# Patient Record
Sex: FEMALE | Race: WHITE | NOT HISPANIC OR LATINO | Employment: OTHER | URBAN - METROPOLITAN AREA
[De-identification: names, ages, dates, MRNs, and addresses within clinical notes are randomized per-mention and may not be internally consistent; named-entity substitution may affect disease eponyms.]

---

## 2017-02-07 ENCOUNTER — TRANSFERRED RECORDS (OUTPATIENT)
Dept: HEALTH INFORMATION MANAGEMENT | Facility: CLINIC | Age: 57
End: 2017-02-07

## 2017-06-20 ENCOUNTER — MYC MEDICAL ADVICE (OUTPATIENT)
Dept: PEDIATRICS | Facility: CLINIC | Age: 57
End: 2017-06-20

## 2017-06-20 DIAGNOSIS — Z11.1 SCREENING EXAMINATION FOR PULMONARY TUBERCULOSIS: Primary | ICD-10-CM

## 2017-06-20 NOTE — TELEPHONE ENCOUNTER
Order pending for lab.  Last office appointment-10/04/16.  Order pended, please sign if in agreement.  NITIN Mari RN

## 2017-06-24 ENCOUNTER — HEALTH MAINTENANCE LETTER (OUTPATIENT)
Age: 57
End: 2017-06-24

## 2017-07-12 DIAGNOSIS — Z11.1 SCREENING EXAMINATION FOR PULMONARY TUBERCULOSIS: ICD-10-CM

## 2017-07-12 PROCEDURE — 36415 COLL VENOUS BLD VENIPUNCTURE: CPT | Performed by: INTERNAL MEDICINE

## 2017-07-12 PROCEDURE — 86480 TB TEST CELL IMMUN MEASURE: CPT | Performed by: INTERNAL MEDICINE

## 2017-07-14 LAB
M TB TUBERC IFN-G BLD QL: NEGATIVE
M TB TUBERC IFN-G/MITOGEN IGNF BLD: 0.05 IU/ML

## 2017-08-01 ENCOUNTER — TRANSFERRED RECORDS (OUTPATIENT)
Dept: HEALTH INFORMATION MANAGEMENT | Facility: CLINIC | Age: 57
End: 2017-08-01

## 2017-09-07 ENCOUNTER — OFFICE VISIT (OUTPATIENT)
Dept: PEDIATRICS | Facility: CLINIC | Age: 57
End: 2017-09-07
Payer: COMMERCIAL

## 2017-09-07 VITALS
BODY MASS INDEX: 19.7 KG/M2 | TEMPERATURE: 98.5 F | DIASTOLIC BLOOD PRESSURE: 64 MMHG | SYSTOLIC BLOOD PRESSURE: 110 MMHG | WEIGHT: 133 LBS | HEIGHT: 69 IN | RESPIRATION RATE: 16 BRPM | HEART RATE: 76 BPM

## 2017-09-07 DIAGNOSIS — M72.0 DUPUYTREN'S CONTRACTURE OF RIGHT HAND: Primary | ICD-10-CM

## 2017-09-07 PROCEDURE — 99213 OFFICE O/P EST LOW 20 MIN: CPT | Performed by: INTERNAL MEDICINE

## 2017-09-07 NOTE — PATIENT INSTRUCTIONS
You are negative for HPV and last pap smear was normal in 2014 so you only needs a pap every 5 years.     Leave the area alone and keep an eye on it. If you develop trigger finger or loss of motion in your hand we will refer you to a hand surgeon.

## 2017-09-07 NOTE — MR AVS SNAPSHOT
After Visit Summary   9/7/2017    Katerina Elizondo    MRN: [id number]           Patient Information     Date Of Birth          1960        Visit Information        Provider Department      9/7/2017 10:40 AM Mily Pak MD [facility]        Today's Diagnoses     Dupuytren's contracture of left hand    -  1      Care Instructions    You are negative for HPV and last pap smear was normal in 2014 so you only needs a pap every 5 years.     Leave the area alone and keep an eye on it. If you develop trigger finger or loss of motion in your hand we will refer you to a hand surgeon.             Follow-ups after your visit        Who to contact     If you have questions or need follow up information about today's clinic visit or your schedule please contact [facility] directly at [phone].  Normal or non-critical lab and imaging results will be communicated to you by [employer], letter or phone within 4 business days after the clinic has received the results. If you do not hear from us within 7 days, please contact the clinic through [employer] or phone. If you have a critical or abnormal lab result, we will notify you by phone as soon as possible.  Submit refill requests through [employer] or call your pharmacy and they will forward the refill request to us. Please allow 3 business days for your refill to be completed.          Additional Information About Your Visit        MyChart Information     [employer] gives you secure access to your electronic health record. If you see a primary care provider, you can also send messages to your care team and make appointments. If you have questions, please call your primary care clinic.  If you do not have a primary care provider, please call [phone] and they will assist you.        Care EveryWhere ID     This is your Care EveryWhere ID. This could be used by other organizations to access your [facility]  "records  [id number]        Your Vitals Were     Pulse Temperature Respirations Height BMI (Body Mass Index)       76 98.5  F (36.9  C) (Oral) 16 5' 8.75\" (1.746 m) 19.78 kg/m2        Blood Pressure from Last 3 Encounters:   09/07/17 110/64   10/04/16 124/60   04/29/14 106/60    Weight from Last 3 Encounters:   09/07/17 133 lb (60.3 kg)   10/04/16 135 lb 1 oz (61.3 kg)   04/29/14 136 lb (61.7 kg)              Today, you had the following     No orders found for display       Primary Care Provider Office Phone # Fax #    Mily Pak [phone] [address] [place] [address]        Equal Access to Services     [facility]: Hadii yoko muñiz hadasho Sowale, waaxda luqadaha, qaybta kaalmada adeyue, debbie fu . So [facility] [phone].    ATENCIÓN: Si habla español, tiene a ramirez disposición servicios gratuitos de asistencia lingüística. [facility] [phone].    We comply with applicable federal civil rights laws and [place] laws. We do not discriminate on the basis of race, color, national origin, age, disability sex, sexual orientation or gender identity.            Thank you!     Thank you for choosing [facility]  for your care. Our goal is always to provide you with excellent care. Hearing back from our patients is one way we can continue to improve our services. Please take a few minutes to complete the written survey that you may receive in the mail after your visit with us. Thank you!             Your Updated Medication List - Protect others around you: Learn how to safely use, store and throw away your medicines at www.disposemymeds.org.          This list is accurate as of: 9/7/17 11:17 AM.  Always use your most recent med list.                   Brand Name Dispense Instructions for use Diagnosis    vitamin D 1000 UNITS capsule      Take 1 capsule by mouth daily.          "

## 2017-09-07 NOTE — NURSING NOTE
"Chief Complaint   Patient presents with     Musculoskeletal Problem     Rt hand palm       Initial /64  Pulse 76  Temp 98.5  F (36.9  C) (Oral)  Resp 16  Ht 5' 8.75\" (1.746 m)  Wt 133 lb (60.3 kg)  BMI 19.78 kg/m2 Estimated body mass index is 19.78 kg/(m^2) as calculated from the following:    Height as of this encounter: 5' 8.75\" (1.746 m).    Weight as of this encounter: 133 lb (60.3 kg).  Medication Reconciliation: complete   Jayla J, CMA,AAMA        "

## 2017-09-07 NOTE — PROGRESS NOTES
SUBJECTIVE:   Katerina Elizondo is a 57 year old female who presents to clinic today for the following health issues:    Katerina presents to the clinic today with the chief complaint of a contracture in her right hand. The problem started around April when she noticed small bumps in her right hand that have persised. The area is non tender and is uncomfortable if pushed on forcefully. She thinks that it occurred from chopping vegetables in the kitchen and reports dense tissue in the area.     Patient was told by a nurse practitioner she needed a pap smear and was wondering how long to wait between testing. She did have HPV testing with her last pap in 2014    Musculoskeletal problem/pain      Duration: 4-6 month    Description  Location: Rt palm of hand    Intensity:  moderate    Accompanying signs and symptoms: uncomfortable    History  Previous similar problem: no   Previous evaluation:  none    Precipitating or alleviating factors:  Trauma or overuse: YES over using. Pt thinks its due to cutting with knife  Aggravating factors include: driving and gripping items.    Therapies tried and outcome: trying to protect it more feels good.        Problem list and histories reviewed & adjusted, as indicated.  Additional history: as documented    Patient Active Problem List   Diagnosis     Menopause     Osteopenia     Advanced directives, counseling/discussion     Hyperlipidemia LDL goal <130     Past Surgical History:   Procedure Laterality Date     HYSTEROSCOPY      lazer ablation of endometriosis     TONSILLECTOMY & ADENOIDECTOMY         Social History   Substance Use Topics     Smoking status: Never Smoker     Smokeless tobacco: Never Used     Alcohol use Yes      Comment: socially     Family History   Problem Relation Age of Onset     Unknown/Adopted Mother      Unknown/Adopted Father          Current Outpatient Prescriptions   Medication Sig Dispense Refill     Cholecalciferol (VITAMIN D) 1000 UNITS capsule Take 1  "capsule by mouth daily.       No Known Allergies      Reviewed and updated as needed this visit by clinical staffTobacco  Allergies  Meds  Med Hx  Surg Hx  Fam Hx  Soc Hx      Reviewed and updated as needed this visit by Provider         ROS:  Constitutional, HEENT, cardiovascular, pulmonary, gi and gu systems are negative, except as otherwise noted.    MS: POSITIVE for Left hand pain, dense tissue     This document serves as a record of the services and decisions personally performed and made by Mily Pak MD. It was created on her behalf by Mariama Piedra, a trained medical scribe. The creation of this document is based the provider's statements to the medical scribe.    Mariama Piedra September 7, 2017 10:58 AM  OBJECTIVE:   /64  Pulse 76  Temp 98.5  F (36.9  C) (Oral)  Resp 16  Ht 1.746 m (5' 8.75\")  Wt 60.3 kg (133 lb)  BMI 19.78 kg/m2  Body mass index is 19.78 kg/(m^2).  GENERAL: healthy, alert and no distress  MS: no gross musculoskeletal defects noted .  Right palm with bump in center of palm over second MCP.  Normal rom of finger, no trigger finger noted.  Right 1st mcp with small nodule, hard, smooth.      Diagnostic Test Results:  none     ASSESSMENT/PLAN:     (M72.0) Dupuytren's contracture of right hand  (primary encounter diagnosis)  -discussed this is likely developing contracture; as no loss of range of motion or trigger finger would not treat for now  -pt to see hand surgeon if developing pain or loss of ROM     Reviewed current ACOG guidelines regarding pap smears.  As patient has had normal paps, a negative HPV with her last pap she is considered low risk and can screen every 5 years.  Next pap with HPV due 4/2019.       Follow up for annual physical examination.       The information in this document, created by the medical scribe for me, accurately reflects the services I personally performed and the decisions made by me. I have reviewed and approved this document for " accuracy prior to leaving the patient care area.  Mily Pak MD  [facility]

## 2017-09-09 ENCOUNTER — HEALTH MAINTENANCE LETTER (OUTPATIENT)
Age: 57
End: 2017-09-09

## 2018-01-29 ENCOUNTER — TELEPHONE (OUTPATIENT)
Dept: PEDIATRICS | Facility: CLINIC | Age: 58
End: 2018-01-29

## 2018-01-29 DIAGNOSIS — Z12.31 ENCOUNTER FOR SCREENING MAMMOGRAM FOR BREAST CANCER: Primary | ICD-10-CM

## 2018-01-29 NOTE — TELEPHONE ENCOUNTER
Patient scheduled Mammogram, on Thursday, February, 8th, 2018 at 10:45am.    Please order 3D Mammogram, in order to scheduled requested specified procedure.

## 2018-01-30 NOTE — TELEPHONE ENCOUNTER
Order placed.  There isn't a separate 3 D mammo order, she just needs to tell them she wants the 3d.    Mily Pak MD

## 2018-02-08 ENCOUNTER — RADIANT APPOINTMENT (OUTPATIENT)
Dept: MAMMOGRAPHY | Facility: CLINIC | Age: 58
End: 2018-02-08
Payer: COMMERCIAL

## 2018-02-08 DIAGNOSIS — Z12.31 ENCOUNTER FOR SCREENING MAMMOGRAM FOR BREAST CANCER: ICD-10-CM

## 2018-02-08 PROCEDURE — 77067 SCR MAMMO BI INCL CAD: CPT | Mod: TC

## 2018-02-08 PROCEDURE — 77063 BREAST TOMOSYNTHESIS BI: CPT | Mod: TC

## 2018-05-18 ENCOUNTER — TELEPHONE (OUTPATIENT)
Dept: OTHER | Facility: CLINIC | Age: 58
End: 2018-05-18

## 2018-05-18 ENCOUNTER — TELEPHONE (OUTPATIENT)
Dept: PEDIATRICS | Facility: CLINIC | Age: 58
End: 2018-05-18

## 2018-05-18 NOTE — TELEPHONE ENCOUNTER
Panel Management Review      Patient has the following on her problem list: None      Composite cancer screening  Chart review shows that this patient is due/due soon for the following None  Summary:    Patient is due/failing the following:   PAP    Action needed:   None    Type of outreach:    None, routed to provider for review.    Questions for provider review:    None                                                                                                                                    Sarahi Escalera CMA ([facility])        Chart routed to Care Team .

## 2018-07-31 ENCOUNTER — MYC MEDICAL ADVICE (OUTPATIENT)
Dept: PEDIATRICS | Facility: CLINIC | Age: 58
End: 2018-07-31

## 2018-07-31 DIAGNOSIS — Z11.1 SCREENING EXAMINATION FOR PULMONARY TUBERCULOSIS: ICD-10-CM

## 2018-07-31 DIAGNOSIS — Z01.84 IMMUNITY STATUS TESTING: Primary | ICD-10-CM

## 2018-07-31 NOTE — TELEPHONE ENCOUNTER
Labs to document immunity?  Orders pended for antibody testing, please review and sign if in agreement.  NITIN Mari RN

## 2018-08-01 NOTE — TELEPHONE ENCOUNTER
Chart addended to cancel order for Rubella antibody as patient has this documentation already.  NITIN Mari RN

## 2018-08-03 DIAGNOSIS — Z11.1 SCREENING EXAMINATION FOR PULMONARY TUBERCULOSIS: ICD-10-CM

## 2018-08-03 DIAGNOSIS — Z01.84 IMMUNITY STATUS TESTING: ICD-10-CM

## 2018-08-03 PROCEDURE — 86480 TB TEST CELL IMMUN MEASURE: CPT | Performed by: INTERNAL MEDICINE

## 2018-08-03 PROCEDURE — 86765 RUBEOLA ANTIBODY: CPT | Performed by: INTERNAL MEDICINE

## 2018-08-03 PROCEDURE — 86787 VARICELLA-ZOSTER ANTIBODY: CPT | Performed by: INTERNAL MEDICINE

## 2018-08-03 PROCEDURE — 86735 MUMPS ANTIBODY: CPT | Performed by: INTERNAL MEDICINE

## 2018-08-03 PROCEDURE — 36415 COLL VENOUS BLD VENIPUNCTURE: CPT | Performed by: INTERNAL MEDICINE

## 2018-08-06 LAB
M TB TUBERC IFN-G BLD QL: NEGATIVE
M TB TUBERC IFN-G/MITOGEN IGNF BLD: 0.01 IU/ML
MEV IGG SER QL IA: 6.4 AI (ref 0–0.8)
MUV IGG SER QL IA: 1.4 AI (ref 0–0.8)
VZV IGG SER QL IA: 4 AI (ref 0–0.8)

## 2018-10-31 ENCOUNTER — MYC MEDICAL ADVICE (OUTPATIENT)
Dept: PEDIATRICS | Facility: CLINIC | Age: 58
End: 2018-10-31

## 2019-03-22 DIAGNOSIS — Z12.31 VISIT FOR SCREENING MAMMOGRAM: ICD-10-CM

## 2019-03-22 PROCEDURE — 77063 BREAST TOMOSYNTHESIS BI: CPT | Mod: TC

## 2019-03-22 PROCEDURE — 77067 SCR MAMMO BI INCL CAD: CPT | Mod: TC

## 2019-05-30 ENCOUNTER — MYC MEDICAL ADVICE (OUTPATIENT)
Dept: PEDIATRICS | Facility: CLINIC | Age: 59
End: 2019-05-30

## 2019-06-05 ENCOUNTER — MYC MEDICAL ADVICE (OUTPATIENT)
Dept: PEDIATRICS | Facility: CLINIC | Age: 59
End: 2019-06-05

## 2019-06-06 NOTE — TELEPHONE ENCOUNTER
Please review the [facility] message from pt & help to reschedule. Thanks.    Joanna MIRANDA, RN  Triage Nurse

## 2019-09-18 ENCOUNTER — MYC MEDICAL ADVICE (OUTPATIENT)
Dept: PEDIATRICS | Facility: CLINIC | Age: 59
End: 2019-09-18

## 2019-09-18 DIAGNOSIS — Z11.1 SCREENING EXAMINATION FOR PULMONARY TUBERCULOSIS: Primary | ICD-10-CM

## 2019-09-23 DIAGNOSIS — Z11.1 SCREENING EXAMINATION FOR PULMONARY TUBERCULOSIS: ICD-10-CM

## 2019-09-23 PROCEDURE — 86481 TB AG RESPONSE T-CELL SUSP: CPT | Performed by: INTERNAL MEDICINE

## 2019-09-23 PROCEDURE — 36415 COLL VENOUS BLD VENIPUNCTURE: CPT | Performed by: INTERNAL MEDICINE

## 2019-09-25 LAB
GAMMA INTERFERON BACKGROUND BLD IA-ACNC: 0.02 IU/ML
M TB IFN-G BLD-IMP: NEGATIVE
M TB IFN-G CD4+ BCKGRND COR BLD-ACNC: 9.76 IU/ML
MITOGEN IGNF BCKGRD COR BLD-ACNC: 0.04 IU/ML
MITOGEN IGNF BCKGRD COR BLD-ACNC: 0.05 IU/ML

## 2019-10-07 ENCOUNTER — MYC MEDICAL ADVICE (OUTPATIENT)
Dept: PEDIATRICS | Facility: CLINIC | Age: 59
End: 2019-10-07

## 2019-10-28 ENCOUNTER — MYC MEDICAL ADVICE (OUTPATIENT)
Dept: PEDIATRICS | Facility: CLINIC | Age: 59
End: 2019-10-28

## 2019-10-28 DIAGNOSIS — M72.0 DUPUYTREN CONTRACTURE: Primary | ICD-10-CM

## 2019-10-28 DIAGNOSIS — M72.0 DUPUYTREN'S CONTRACTURE OF RIGHT HAND: ICD-10-CM

## 2019-10-29 NOTE — TELEPHONE ENCOUNTER
See Xochilt and silvestree. Last OV was 9/2017. Per OV notes:    (M72.0) Dupuytren's contracture of right hand  (primary encounter diagnosis)  -discussed this is likely developing contracture; as no loss of range of motion or trigger finger would not treat for now  -pt to see hand surgeon if developing pain or loss of ROM

## 2019-11-01 ENCOUNTER — TRANSFERRED RECORDS (OUTPATIENT)
Dept: MULTI SPECIALTY CLINIC | Facility: CLINIC | Age: 59
End: 2019-11-01

## 2019-11-01 LAB — PAP SMEAR - HIM PATIENT REPORTED: NORMAL

## 2019-11-11 ENCOUNTER — TRANSFERRED RECORDS (OUTPATIENT)
Dept: HEALTH INFORMATION MANAGEMENT | Facility: CLINIC | Age: 59
End: 2019-11-11

## 2019-11-11 LAB
HPV ABSTRACT: NORMAL
PAP-ABSTRACT: NORMAL

## 2019-12-15 ENCOUNTER — HEALTH MAINTENANCE LETTER (OUTPATIENT)
Age: 59
End: 2019-12-15

## 2020-02-03 ENCOUNTER — TRANSFERRED RECORDS (OUTPATIENT)
Dept: HEALTH INFORMATION MANAGEMENT | Facility: CLINIC | Age: 60
End: 2020-02-03

## 2020-06-03 ENCOUNTER — VIRTUAL VISIT (OUTPATIENT)
Dept: PEDIATRICS | Facility: CLINIC | Age: 60
End: 2020-06-03
Payer: COMMERCIAL

## 2020-06-03 DIAGNOSIS — Z76.89 ENCOUNTER TO ESTABLISH CARE: Primary | ICD-10-CM

## 2020-06-03 DIAGNOSIS — J30.2 SEASONAL ALLERGIC RHINITIS, UNSPECIFIED TRIGGER: ICD-10-CM

## 2020-06-03 PROCEDURE — 99214 OFFICE O/P EST MOD 30 MIN: CPT | Mod: GC | Performed by: STUDENT IN AN ORGANIZED HEALTH CARE EDUCATION/TRAINING PROGRAM

## 2020-06-03 RX ORDER — ESTRADIOL 0.1 MG/G
CREAM VAGINAL
COMMUNITY
Start: 2020-05-29

## 2020-06-03 RX ORDER — AMOXICILLIN 500 MG
CAPSULE ORAL
COMMUNITY
End: 2023-09-28

## 2020-06-03 RX ORDER — CETIRIZINE HYDROCHLORIDE 10 MG/1
10 TABLET ORAL DAILY
COMMUNITY
End: 2022-10-24

## 2020-06-03 NOTE — PROGRESS NOTES
"Katerina Elziondo is a 60 year old female who is being evaluated via a billable telephone visit.      The patient has been notified of following:     \"This telephone visit will be conducted via a call between you and your physician/provider. We have found that certain health care needs can be provided without the need for a physical exam.  This service lets us provide the care you need with a short phone conversation.  If a prescription is necessary we can send it directly to your pharmacy.  If lab work is needed we can place an order for that and you can then stop by our lab to have the test done at a later time.    Telephone visits are billed at different rates depending on your insurance coverage. During this emergency period, for some insurers they may be billed the same as an in-person visit.  Please reach out to your insurance provider with any questions.    If during the course of the call the physician/provider feels a telephone visit is not appropriate, you will not be charged for this service.\"    Patient has given verbal consent for Telephone visit?  Yes    What phone number would you like to be contacted at? [phone]    How would you like to obtain your AVS? Xochilt Odom     Katerina Elizondo is a 60 year old female who presents via phone visit today for the following health issues:    HPI Answers for HPI/ROS submitted by the patient on 6/2/2020   Chronic problems general questions HPI Form  How many servings of fruits and vegetables do you eat daily?: 4 or more  On average, how many sweetened beverages do you drink each day (Examples: soda, juice, sweet tea, etc.  Do NOT count diet or artificially sweetened beverages)?: 1  How many minutes a day do you exercise enough to make your heart beat faster?: 30 to 60  How many days a week do you exercise enough to make your heart beat faster?: 4  How many days per week do you miss taking your medication?: 0    New Patient/Transfer of Care    Interested " in establishing care with a new provider since her PCP has left   -------------------------------------    Patient Active Problem List   Diagnosis     Menopause     Osteopenia     Advanced directives, counseling/discussion     Hyperlipidemia LDL goal <130     Past Surgical History:   Procedure Laterality Date     HYSTEROSCOPY      lazer ablation of endometriosis     TONSILLECTOMY & ADENOIDECTOMY         Social History     Tobacco Use     Smoking status: Never Smoker     Smokeless tobacco: Never Used   Substance Use Topics     Alcohol use: Yes     Comment: socially     Family History   Problem Relation Age of Onset     Unknown/Adopted Mother      Unknown/Adopted Father          Current Outpatient Medications   Medication Sig Dispense Refill     Calcium Carb-Cholecalciferol (CALCIUM 1000 + D PO)        cetirizine (ZYRTEC) 10 MG tablet Take 10 mg by mouth daily       estradiol (ESTRACE) 0.1 MG/GM vaginal cream I 1 GRAM VAGINALLY 3 TIMES Q WK       Omega-3 Fatty Acids (FISH OIL) 1200 MG capsule        Allergies   Allergen Reactions     Seasonal Allergies      Recent Labs   Lab Test 10/11/16  0752 04/29/14  1049 10/23/12  1032   * 177* 195*   HDL 46* 34* 46*   TRIG 71 101 93   ALT 21 24 27   CR 0.71 0.70 0.61   GFRESTIMATED 84 87 >90   GFRESTBLACK >90   GFR Calc   >90 >90   POTASSIUM 4.1 4.2 3.9      BP Readings from Last 3 Encounters:   09/07/17 110/64   10/04/16 124/60   04/29/14 106/60    Wt Readings from Last 3 Encounters:   09/07/17 60.3 kg (133 lb)   10/04/16 61.3 kg (135 lb 1 oz)   04/29/14 61.7 kg (136 lb)                    Reviewed and updated as needed this visit by Provider  Meds         Review of Systems   Constitutional, HEENT, cardiovascular, pulmonary, gi and gu systems are negative, except as otherwise noted.       Objective   Reported vitals:  There were no vitals taken for this visit.   healthy and no distress  PSYCH: Alert and oriented times 3; coherent speech, normal   rate  and volume, able to articulate logical thoughts, able   to abstract reason, no tangential thoughts, no hallucinations   or delusions  Her affect is normal  RESP: No cough, no audible wheezing, able to talk in full sentences  Remainder of exam unable to be completed due to telephone visits    Diagnostic Test Results:  none         Assessment/Plan:  1. Establish care:  Hannah is doing well.  She does not have any specific concerns today.  She notes intermittent muscle aches and pains for which she will take tylenol a couple times a week.  Increased stress in the setting of COVID, but manageable with gardening and regular exercise.    2. Seasonal allergies:   Seasonal allergies.  Currently most symptoms are nasal.  Notes that she has been taking zyrtec, however it makes her sleepy.  -Try flonase  - Can continue cetirizine per preference     Return in about 6 months (around 12/3/2020) for Routine Visit.      Phone call duration:  25 minutes    Priyanka Ivan MD    I have discussed the patient with the resident and agree with the jointly developed plan as documented above    Georgina Franco MD  Internal Medicine - Pediatrics

## 2020-06-03 NOTE — PATIENT INSTRUCTIONS
Great talking to you today!     I set Dr. Torres as your new PCP.    For your allergies you can try flonase, which you can buy over the counter to see if this helps with your congestion and runny nose.

## 2020-08-24 ENCOUNTER — ANCILLARY PROCEDURE (OUTPATIENT)
Dept: MAMMOGRAPHY | Facility: CLINIC | Age: 60
End: 2020-08-24
Attending: PEDIATRICS
Payer: COMMERCIAL

## 2020-08-24 DIAGNOSIS — Z12.31 VISIT FOR SCREENING MAMMOGRAM: ICD-10-CM

## 2020-08-24 PROCEDURE — 77067 SCR MAMMO BI INCL CAD: CPT | Mod: TC

## 2020-08-24 PROCEDURE — 77063 BREAST TOMOSYNTHESIS BI: CPT | Mod: TC

## 2021-01-15 ENCOUNTER — HEALTH MAINTENANCE LETTER (OUTPATIENT)
Age: 61
End: 2021-01-15

## 2021-04-14 ENCOUNTER — IMMUNIZATION (OUTPATIENT)
Dept: NURSING | Facility: CLINIC | Age: 61
End: 2021-04-14
Payer: COMMERCIAL

## 2021-04-14 PROCEDURE — 0001A PR COVID VAC PFIZER DIL RECON 30 MCG/0.3 ML IM: CPT

## 2021-04-14 PROCEDURE — 91300 PR COVID VAC PFIZER DIL RECON 30 MCG/0.3 ML IM: CPT

## 2021-05-05 ENCOUNTER — IMMUNIZATION (OUTPATIENT)
Dept: NURSING | Facility: CLINIC | Age: 61
End: 2021-05-05
Attending: INTERNAL MEDICINE
Payer: COMMERCIAL

## 2021-05-05 PROCEDURE — 0002A PR COVID VAC PFIZER DIL RECON 30 MCG/0.3 ML IM: CPT

## 2021-05-05 PROCEDURE — 91300 PR COVID VAC PFIZER DIL RECON 30 MCG/0.3 ML IM: CPT

## 2021-07-21 ENCOUNTER — TRANSFERRED RECORDS (OUTPATIENT)
Dept: HEALTH INFORMATION MANAGEMENT | Facility: CLINIC | Age: 61
End: 2021-07-21

## 2021-09-09 ENCOUNTER — ANCILLARY PROCEDURE (OUTPATIENT)
Dept: MAMMOGRAPHY | Facility: CLINIC | Age: 61
End: 2021-09-09
Attending: PEDIATRICS
Payer: COMMERCIAL

## 2021-09-09 DIAGNOSIS — Z12.31 VISIT FOR SCREENING MAMMOGRAM: ICD-10-CM

## 2021-09-09 PROCEDURE — 77063 BREAST TOMOSYNTHESIS BI: CPT | Mod: TC | Performed by: RADIOLOGY

## 2021-09-09 PROCEDURE — 77067 SCR MAMMO BI INCL CAD: CPT | Mod: TC | Performed by: RADIOLOGY

## 2021-09-20 ENCOUNTER — OFFICE VISIT (OUTPATIENT)
Dept: PEDIATRICS | Facility: CLINIC | Age: 61
End: 2021-09-20
Payer: COMMERCIAL

## 2021-09-20 VITALS
HEART RATE: 71 BPM | SYSTOLIC BLOOD PRESSURE: 126 MMHG | BODY MASS INDEX: 19.95 KG/M2 | OXYGEN SATURATION: 98 % | WEIGHT: 134.7 LBS | HEIGHT: 69 IN | DIASTOLIC BLOOD PRESSURE: 58 MMHG | TEMPERATURE: 97.8 F | RESPIRATION RATE: 14 BRPM

## 2021-09-20 DIAGNOSIS — Z00.00 ROUTINE GENERAL MEDICAL EXAMINATION AT A HEALTH CARE FACILITY: Primary | ICD-10-CM

## 2021-09-20 DIAGNOSIS — Z13.220 SCREENING CHOLESTEROL LEVEL: ICD-10-CM

## 2021-09-20 DIAGNOSIS — Z13.1 SCREENING FOR DIABETES MELLITUS: ICD-10-CM

## 2021-09-20 DIAGNOSIS — M85.80 OSTEOPENIA, UNSPECIFIED LOCATION: ICD-10-CM

## 2021-09-20 DIAGNOSIS — E78.5 HYPERLIPIDEMIA LDL GOAL <160: ICD-10-CM

## 2021-09-20 PROBLEM — M72.0 DUPUYTREN'S CONTRACTURE OF RIGHT HAND: Status: ACTIVE | Noted: 2021-09-20

## 2021-09-20 PROCEDURE — 99386 PREV VISIT NEW AGE 40-64: CPT | Performed by: INTERNAL MEDICINE

## 2021-09-20 ASSESSMENT — ENCOUNTER SYMPTOMS
BREAST MASS: 0
ABDOMINAL PAIN: 0
ARTHRALGIAS: 0
HEARTBURN: 0
SHORTNESS OF BREATH: 0
HEMATURIA: 0
CONSTIPATION: 0
DYSURIA: 0
FEVER: 0
NAUSEA: 0
HEMATOCHEZIA: 0
CHILLS: 0
EYE PAIN: 0
WEAKNESS: 0
HEADACHES: 0
FREQUENCY: 0
PALPITATIONS: 0
DIZZINESS: 0
MYALGIAS: 0
DIARRHEA: 0
COUGH: 0
SORE THROAT: 0
JOINT SWELLING: 0
NERVOUS/ANXIOUS: 0
PARESTHESIAS: 0

## 2021-09-20 ASSESSMENT — MIFFLIN-ST. JEOR: SCORE: 1226.41

## 2021-09-20 NOTE — PROGRESS NOTES
SUBJECTIVE:   CC: Katerina Elizondo is an 61 year old woman who presents for preventive health visit.   Patient has been advised of split billing requirements and indicates understanding: Yes     Healthy Habits:     Getting at least 3 servings of Calcium per day:  NO    Bi-annual eye exam:  Yes    Dental care twice a year:  NO    Sleep apnea or symptoms of sleep apnea:  None    Diet:  Regular (no restrictions)    Frequency of exercise:  6-7 days/week    Duration of exercise:  30-45 minutes    Taking medications regularly:  No    Medication side effects:  Other    PHQ-2 Total Score: 0    Additional concerns today:  Yes    - Would like to establish care     Does some online yoga  Walks a lot with   Does her own strength training a few times a week  Really doing well right now - mental health took a hit during the pandemic.   Birth mother didn't want to connect.     # HCM  - mammo done 9/9/21  - colon done 10/29/2012  - last pap OB/GYN Dr Estelita Valadez on this date: 11/2019 - [facility]; saw her a month ago.   - future fasting labs  - zoster due     Today's PHQ-2 Score:   PHQ-2 ( 1999 Pfizer) 9/20/2021   Q1: Little interest or pleasure in doing things 0   Q2: Feeling down, depressed or hopeless 0   PHQ-2 Score 0   Q1: Little interest or pleasure in doing things Not at all   Q2: Feeling down, depressed or hopeless Not at all   PHQ-2 Score 0     Abuse: Current or Past (Physical, Sexual or Emotional) - No  Do you feel safe in your environment? Yes    Have you ever done Advance Care Planning? (For example, a Health Directive, POLST, or a discussion with a medical provider or your loved ones about your wishes): No, advance care planning information given to patient to review.  Patient plans to discuss their wishes with loved ones or provider.      Social History     Tobacco Use     Smoking status: Never Smoker     Smokeless tobacco: Never Used   Substance Use Topics     Alcohol use: Yes     Comment: socially      Alcohol Use 9/20/2021   Prescreen: >3 drinks/day or >7 drinks/week? No   Prescreen: >3 drinks/day or >7 drinks/week? -     Reviewed orders with patient.  Reviewed health maintenance and updated orders accordingly - Yes    Breast Cancer Screening:  Any new diagnosis of family breast, ovarian, or bowel cancer? No    FHS-7:   Breast CA Risk Assessment (FHS-7) 9/9/2021   Did any of your first-degree relatives have breast or ovarian cancer? Unknown   Did any of your relatives have bilateral breast cancer? Unknown   Did any man in your family have breast cancer? Unknown   Did any woman in your family have breast and ovarian cancer? Unknown   Did any woman in your family have breast cancer before age 50 y? Unknown   Do you have 2 or more relatives with breast and/or ovarian cancer? Unknown   Do you have 2 or more relatives with breast and/or bowel cancer? Unknown     Pertinent mammograms are reviewed under the imaging tab.    History of abnormal Pap smear: NO - age 30-65 PAP every 5 years with negative HPV co-testing recommended  Will get records from OB. Has no concerns and prefers to space to 5 years if qualfies.     Reviewed and updated as needed this visit by clinical staff  Tobacco  Allergies  Meds   Med Hx  Surg Hx  Fam Hx  Soc Hx        Reviewed and updated as needed this visit by Provider                Past Medical History:   Diagnosis Date     Endometriosis       Past Surgical History:   Procedure Laterality Date     HYSTEROSCOPY      lazer ablation of endometriosis     TONSILLECTOMY & ADENOIDECTOMY         Review of Systems   Constitutional: Negative for chills and fever.   HENT: Negative for congestion, ear pain, hearing loss and sore throat.    Eyes: Negative for pain and visual disturbance.   Respiratory: Negative for cough and shortness of breath.    Cardiovascular: Negative for chest pain, palpitations and peripheral edema.   Gastrointestinal: Negative for abdominal pain, constipation, diarrhea,  "heartburn, hematochezia and nausea.   Breasts:  Negative for tenderness, breast mass and discharge.   Genitourinary: Negative for dysuria, frequency, genital sores, hematuria, pelvic pain, urgency, vaginal bleeding and vaginal discharge.   Musculoskeletal: Negative for arthralgias, joint swelling and myalgias.   Skin: Negative for rash.   Neurological: Negative for dizziness, weakness, headaches and paresthesias.   Psychiatric/Behavioral: Negative for mood changes. The patient is not nervous/anxious.         OBJECTIVE:   /58 (BP Location: Right arm, Patient Position: Chair, Cuff Size: Adult Regular)   Pulse 71   Temp 97.8  F (36.6  C) (Tympanic)   Resp 14   Ht 1.746 m (5' 8.75\")   Wt 61.1 kg (134 lb 11.2 oz)   SpO2 98%   BMI 20.04 kg/m    Physical Exam  GENERAL: healthy, alert and no distress  EYES: Eyes grossly normal to inspection, PERRL and conjunctivae and sclerae normal  HENT: ear canals and TM's normal, nose and mouth without ulcers or lesions  NECK: no adenopathy, no asymmetry, masses, or scars and thyroid normal to palpation  RESP: lungs clear to auscultation - no rales, rhonchi or wheezes  CV: regular rate and rhythm, normal S1 S2, no S3 or S4, no murmur, click or rub, no peripheral edema and peripheral pulses strong  ABDOMEN: soft, nontender, no hepatosplenomegaly, no masses and bowel sounds normal  MS: no gross musculoskeletal defects noted, no edema  SKIN: no suspicious lesions or rashes  NEURO: Normal strength and tone, mentation intact and speech normal  PSYCH: mentation appears normal, affect normal/bright    Diagnostic Test Results:  Labs reviewed in Epic    ASSESSMENT/PLAN:   (Z00.00) Routine general medical examination at a health care facility  (primary encounter diagnosis)  - due for shingles vaccine - will get at pharmacy  - due for dexa as below  - will return for fasting labs  - mammo utd  - colonoscopy utd  - will get pap records from OB.    (M86.80) Osteopenia, unspecified " "location  Vitamin D Deficiency Screening Results:  Lab Results   Component Value Date    VITDT 55 04/29/2014    VITDT  10/23/2012     Specimen sent to [facility], see misc result.   On 1000 international unit(s) daily.   Due for 5 year f/up in October.   Plan: DX Hip/Pelvis/Spine, Vitamin D Deficiency    (Z13.1) Screening for diabetes mellitus  Plan: Comprehensive metabolic panel (BMP + Alb, Alk         Phos, ALT, AST, Total. Bili, TP), DX         Hip/Pelvis/Spine    (Z13.220) Screening cholesterol level  Plan: Lipid panel reflex to direct LDL Fasting    Patient has been advised of split billing requirements and indicates understanding: No  COUNSELING:  Reviewed preventive health counseling, as reflected in patient instructions       Regular exercise       Healthy diet/nutrition       Immunizations       Osteoporosis prevention/bone health       Colon cancer screening    Estimated body mass index is 20.04 kg/m  as calculated from the following:    Height as of this encounter: 1.746 m (5' 8.75\").    Weight as of this encounter: 61.1 kg (134 lb 11.2 oz).    She reports that she has never smoked. She has never used smokeless tobacco.    Counseling Resources:  ATP IV Guidelines  Pooled Cohorts Equation Calculator  Breast Cancer Risk Calculator  BRCA-Related Cancer Risk Assessment: FHS-7 Tool  FRAX Risk Assessment  ICSI Preventive Guidelines  Dietary Guidelines for Americans, 2010  USDA's MyPlate  ASA Prophylaxis  Lung CA Screening    Michael Umanzor MD  [facility]  "

## 2021-09-20 NOTE — PATIENT INSTRUCTIONS
Great to meet you!    We'll have you back for fasting labs.   We can do your dexa after 10/24/21.    If you are over 50 I recommend the new Shingrix vaccine. Two doses of Shingrix provides more than 90% protection against shingles and postherpetic neuralgia (PHN), a type of chronic pain that is the most common complication of shingles.   - even if you've had the older shingles vaccine (Zostavax) it's okay to get the new vaccine.   - even if you've had singles before the vaccine can be helpful.    You do not need an appointment and can walk in any time they are open. The vaccine is a two shot series - I recommend getting the second shot 2-6 months after the first dose.    You may have a sore arm and feel mild flu-like symptoms for a day or two after the vaccine. Most people do not need to adjust their regular activities. It's okay to take tylenol or ibuprofen if you have side effects.       Preventive Health Recommendations  Female Ages 50 - 64    Yearly exam: See your health care provider every year in order to  o Review health changes.   o Discuss preventive care.    o Review your medicines if your doctor has prescribed any.      Get a Pap test every three years (unless you have an abnormal result and your provider advises testing more often).    If you get Pap tests with HPV test, you only need to test every 5 years, unless you have an abnormal result.     You do not need a Pap test if your uterus was removed (hysterectomy) and you have not had cancer.    You should be tested each year for STDs (sexually transmitted diseases) if you're at risk.     Have a mammogram every 1 to 2 years.    Have a colonoscopy at age 50, or have a yearly FIT test (stool test). These exams screen for colon cancer.      Have a cholesterol test every 5 years, or more often if advised.    Have a diabetes test (fasting glucose) every three years. If you are at risk for diabetes, you should have this test more often.     If you are at risk  for osteoporosis (brittle bone disease), think about having a bone density scan (DEXA).    Shots: Get a flu shot each year. Get a tetanus shot every 10 years.    Nutrition:     Eat at least 5 servings of fruits and vegetables each day.    Eat whole-grain bread, whole-wheat pasta and brown rice instead of white grains and rice.    Get adequate Calcium and Vitamin D.     Lifestyle    Exercise at least 150 minutes a week (30 minutes a day, 5 days a week). This will help you control your weight and prevent disease.    Limit alcohol to one drink per day.    No smoking.     Wear sunscreen to prevent skin cancer.     See your dentist every six months for an exam and cleaning.    See your eye doctor every 1 to 2 years.

## 2021-09-20 NOTE — PROGRESS NOTES
# HCM  - mammo done 9/9/21  - colon done 10/29/2012  - last pap OB/GYN Dr Estelita Valadez on this date: 11/2019 - [facility]  - future fasting labs  - zoster due

## 2021-10-12 ENCOUNTER — LAB (OUTPATIENT)
Dept: LAB | Facility: CLINIC | Age: 61
End: 2021-10-12
Payer: COMMERCIAL

## 2021-10-12 DIAGNOSIS — M85.80 OSTEOPENIA, UNSPECIFIED LOCATION: ICD-10-CM

## 2021-10-12 DIAGNOSIS — Z13.1 SCREENING FOR DIABETES MELLITUS: ICD-10-CM

## 2021-10-12 DIAGNOSIS — Z13.220 SCREENING CHOLESTEROL LEVEL: ICD-10-CM

## 2021-10-12 LAB — DEPRECATED CALCIDIOL+CALCIFEROL SERPL-MC: 55 UG/L (ref 20–75)

## 2021-10-12 PROCEDURE — 80061 LIPID PANEL: CPT

## 2021-10-12 PROCEDURE — 82306 VITAMIN D 25 HYDROXY: CPT

## 2021-10-12 PROCEDURE — 36415 COLL VENOUS BLD VENIPUNCTURE: CPT

## 2021-10-12 PROCEDURE — 80053 COMPREHEN METABOLIC PANEL: CPT

## 2021-10-13 LAB
ALBUMIN SERPL-MCNC: 3.8 G/DL (ref 3.4–5)
ALP SERPL-CCNC: 43 U/L (ref 40–150)
ALT SERPL W P-5'-P-CCNC: 20 U/L (ref 0–50)
ANION GAP SERPL CALCULATED.3IONS-SCNC: 6 MMOL/L (ref 3–14)
AST SERPL W P-5'-P-CCNC: 24 U/L (ref 0–45)
BILIRUB SERPL-MCNC: 0.6 MG/DL (ref 0.2–1.3)
BUN SERPL-MCNC: 18 MG/DL (ref 7–30)
CALCIUM SERPL-MCNC: 9 MG/DL (ref 8.5–10.1)
CHLORIDE BLD-SCNC: 106 MMOL/L (ref 94–109)
CHOLEST SERPL-MCNC: 250 MG/DL
CO2 SERPL-SCNC: 25 MMOL/L (ref 20–32)
CREAT SERPL-MCNC: 0.79 MG/DL (ref 0.52–1.04)
FASTING STATUS PATIENT QL REPORTED: YES
GFR SERPL CREATININE-BSD FRML MDRD: 80 ML/MIN/1.73M2
GLUCOSE BLD-MCNC: 77 MG/DL (ref 70–99)
HDLC SERPL-MCNC: 49 MG/DL
LDLC SERPL CALC-MCNC: 187 MG/DL
NONHDLC SERPL-MCNC: 201 MG/DL
POTASSIUM BLD-SCNC: 4.1 MMOL/L (ref 3.4–5.3)
PROT SERPL-MCNC: 7.1 G/DL (ref 6.8–8.8)
SODIUM SERPL-SCNC: 137 MMOL/L (ref 133–144)
TRIGL SERPL-MCNC: 72 MG/DL

## 2021-10-25 ENCOUNTER — ANCILLARY PROCEDURE (OUTPATIENT)
Dept: BONE DENSITY | Facility: CLINIC | Age: 61
End: 2021-10-25
Attending: INTERNAL MEDICINE
Payer: COMMERCIAL

## 2021-10-25 DIAGNOSIS — M85.80 OSTEOPENIA, UNSPECIFIED LOCATION: ICD-10-CM

## 2021-10-25 DIAGNOSIS — Z13.1 SCREENING FOR DIABETES MELLITUS: ICD-10-CM

## 2021-10-25 PROCEDURE — 77080 DXA BONE DENSITY AXIAL: CPT | Performed by: INTERNAL MEDICINE

## 2022-08-16 ENCOUNTER — MYC MEDICAL ADVICE (OUTPATIENT)
Dept: PEDIATRICS | Facility: CLINIC | Age: 62
End: 2022-08-16

## 2022-10-11 ENCOUNTER — ANCILLARY PROCEDURE (OUTPATIENT)
Dept: MAMMOGRAPHY | Facility: CLINIC | Age: 62
End: 2022-10-11
Payer: COMMERCIAL

## 2022-10-11 ENCOUNTER — MYC MEDICAL ADVICE (OUTPATIENT)
Dept: PEDIATRICS | Facility: CLINIC | Age: 62
End: 2022-10-11

## 2022-10-11 ENCOUNTER — TELEPHONE (OUTPATIENT)
Dept: PEDIATRICS | Facility: CLINIC | Age: 62
End: 2022-10-11

## 2022-10-11 DIAGNOSIS — Z12.31 VISIT FOR SCREENING MAMMOGRAM: ICD-10-CM

## 2022-10-11 PROCEDURE — 77067 SCR MAMMO BI INCL CAD: CPT | Mod: TC | Performed by: RADIOLOGY

## 2022-10-11 PROCEDURE — 77063 BREAST TOMOSYNTHESIS BI: CPT | Mod: TC | Performed by: RADIOLOGY

## 2022-10-11 NOTE — TELEPHONE ENCOUNTER
Patient calling and saw abnormal mammo result.  Wanting to schedule U/S.  Gave scheduling # and transferred to that #.  Rama Kemp RN    please call [phone] to schedule an appointment for these tests if you have not already done so.

## 2022-10-12 ENCOUNTER — HOSPITAL ENCOUNTER (OUTPATIENT)
Dept: ULTRASOUND IMAGING | Facility: CLINIC | Age: 62
Discharge: HOME | End: 2022-10-12
Attending: INTERNAL MEDICINE | Admitting: INTERNAL MEDICINE
Payer: COMMERCIAL

## 2022-10-12 DIAGNOSIS — R92.8 ABNORMAL MAMMOGRAM: ICD-10-CM

## 2022-10-12 PROCEDURE — 76642 ULTRASOUND BREAST LIMITED: CPT | Mod: LT

## 2022-10-16 ENCOUNTER — HEALTH MAINTENANCE LETTER (OUTPATIENT)
Age: 62
End: 2022-10-16

## 2022-10-18 ENCOUNTER — HOSPITAL ENCOUNTER (OUTPATIENT)
Dept: MAMMOGRAPHY | Facility: CLINIC | Age: 62
Discharge: HOME | End: 2022-10-18
Attending: INTERNAL MEDICINE
Payer: COMMERCIAL

## 2022-10-18 DIAGNOSIS — R92.8 ABNORMAL MAMMOGRAM: ICD-10-CM

## 2022-10-18 PROCEDURE — 999N000065 MA POST PROCEDURE LEFT

## 2022-10-18 PROCEDURE — 250N000009 HC RX 250: Performed by: STUDENT IN AN ORGANIZED HEALTH CARE EDUCATION/TRAINING PROGRAM

## 2022-10-18 PROCEDURE — 19083 BX BREAST 1ST LESION US IMAG: CPT | Mod: LT

## 2022-10-18 PROCEDURE — 38505 NEEDLE BIOPSY LYMPH NODES: CPT | Mod: LT

## 2022-10-18 PROCEDURE — 88305 TISSUE EXAM BY PATHOLOGIST: CPT | Mod: 26 | Performed by: PATHOLOGY

## 2022-10-18 PROCEDURE — 88305 TISSUE EXAM BY PATHOLOGIST: CPT | Mod: TC | Performed by: INTERNAL MEDICINE

## 2022-10-18 RX ADMIN — LIDOCAINE HYDROCHLORIDE 10 ML: 10 INJECTION, SOLUTION INFILTRATION; PERINEURAL at 08:53

## 2022-10-18 NOTE — DISCHARGE INSTRUCTIONS

## 2022-10-18 NOTE — DISCHARGE INSTRUCTIONS

## 2022-10-19 ENCOUNTER — TELEPHONE (OUTPATIENT)
Dept: MAMMOGRAPHY | Facility: CLINIC | Age: 62
End: 2022-10-19

## 2022-10-19 LAB
PATH REPORT.COMMENTS IMP SPEC: NORMAL
PATH REPORT.FINAL DX SPEC: NORMAL
PATH REPORT.GROSS SPEC: NORMAL
PATH REPORT.MICROSCOPIC SPEC OTHER STN: NORMAL
PHOTO IMAGE: NORMAL

## 2022-10-19 NOTE — TELEPHONE ENCOUNTER
"Call placed to Hannah.  verified.     Hannah was notified that pathology results from LEFT breast and Left Axillary Lymph Node biopsy performed on 10/18/2022 revealed:    [facility]  Katerina Elizondo [id number]  F, 1960  Surgical Pathology Report (Final result) [id number]  Authorizing Provider: Michael Umanzor MD Ordering Provider: Michael Umanzor MD  Ordering Location: [facility]  Collected: 10/18/2022 09:12 AM  Pathologist: Chase Rashid MD Received: 10/18/2022 11:31 AM  .  Specimens  A Breast, Left  B Lymph Node(s), Axillary, Left  .  .  Final Diagnosis  A. Breast, left, 4:00, 1 cm from nipple, ultrasound core biopsy-  Benign breast cyst, no evidence of malignancy  B. Lymph node, left axillary, ultrasound core biopsy-  Morphologically benign lymph node tissue  Electronically signed by Chase Rashid MD on 10/19/2022 at 11:45 AM       Per radiologist, Dr. Pete Ackreman, results are concordant with imaging findings.     Recommendation: 6 month follow up LEFT breast ultrasound and diagnostic mammogram.     No issues reported with biopsy sites.  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  She is very concerned about the imaging/biopsy results being \"discordant\".  Hannah declined a Surgical Referral at this time.  I provided our nurse coordinator, Chelita BURGOS, number for future assistance with scheduling.  All patient's questions answered appropriately and thoroughly. Patient will call our office in the interim with additional questions/concerns.     Both parties in agreement of above plan.      Radha Rushing RN BSN  Procedure Nurse  [facility]  [phone]    "

## 2022-10-20 ENCOUNTER — MYC MEDICAL ADVICE (OUTPATIENT)
Dept: PEDIATRICS | Facility: CLINIC | Age: 62
End: 2022-10-20

## 2022-10-20 DIAGNOSIS — R92.8 ABNORMAL MAMMOGRAM: Primary | ICD-10-CM

## 2022-10-20 NOTE — TELEPHONE ENCOUNTER
I appreciate her note and am glad things looked bengin but understand her concern/perspective.     Dr. Valle is usually who my patients see but she just retired. We can call [facility] and see who is replacing her breast work.     Can also touch base w/ the RN Coordinator to see who they would typically refer to - okay to place this referral.     REID Umanzor MD  Internal Medicine-Pediatrics

## 2022-10-20 NOTE — TELEPHONE ENCOUNTER
Dr. Umanzor,    Please see  message and advise    Thank you  Kendy Cespedes RN on 10/20/2022 at 1:22 PM

## 2022-10-20 NOTE — TELEPHONE ENCOUNTER
Referral placed per hudguillermina w/ PCP. Wozityou message sent to patient at this time.     Ten LOERA RN 10/20/2022 at 2:23 PM

## 2022-11-15 ENCOUNTER — OFFICE VISIT (OUTPATIENT)
Dept: SURGERY | Facility: CLINIC | Age: 62
End: 2022-11-15
Payer: COMMERCIAL

## 2022-11-15 VITALS
BODY MASS INDEX: 19.99 KG/M2 | HEIGHT: 69 IN | SYSTOLIC BLOOD PRESSURE: 124 MMHG | HEART RATE: 81 BPM | WEIGHT: 135 LBS | DIASTOLIC BLOOD PRESSURE: 80 MMHG

## 2022-11-15 DIAGNOSIS — R92.8 ABNORMAL MAMMOGRAM OF LEFT BREAST: Primary | ICD-10-CM

## 2022-11-15 DIAGNOSIS — N64.4 BREAST PAIN: ICD-10-CM

## 2022-11-15 DIAGNOSIS — R92.30 INCONCLUSIVE MAMMOGRAM DUE TO DENSE BREASTS: ICD-10-CM

## 2022-11-15 DIAGNOSIS — R92.2 INCONCLUSIVE MAMMOGRAM DUE TO DENSE BREASTS: ICD-10-CM

## 2022-11-15 PROCEDURE — 99203 OFFICE O/P NEW LOW 30 MIN: CPT | Performed by: SURGERY

## 2022-11-15 NOTE — PROGRESS NOTES
[facility] Breast Surgery Consultation    HPI:   Katerina Elizondo is a 62 year old female who is seen in consultation at the request of Dr. Umanzor for evaluation of an abnormality on her breast imaging and follow-up biopsy.    She had a screening mammogram on October 11, 2022 which revealed a possible mass in the left breast at the 4 to 5 o'clock position anterior depth.  She returned for diagnostic imaging and on ultrasound at 4:00 1 cm from the nipple there was a 7 mm cyst corresponding to the mammographic finding and incidentally noted adjacent to the cyst was an irregular hypoechoic mass measuring 7 mm.  Axillary ultrasound demonstrated a mildly thickened left axillary lymph node.  She then had biopsy of the lesion in the breast as well as the axillary lymph node.  The breast biopsy at 4:00 1 cm from the nipple revealed a benign breast cyst with no evidence of malignancy.  The lymph node on biopsy was morphologically benign.  Radiology had recommended short interval follow-up with a 6-month left diagnostic mammogram and ultrasound.  She is here to discuss her options going forward.    She has not had any prior breast biopsies or breast surgeries.  She is otherwise a very healthy 62-year-old.  She has had a prior breast cyst drained about 7 years ago.  She had no breast concerns prior to her screening mammogram.  She is a retired radiologist.    Hormonal history:   menarche 10, 1 children, 1st at age 30, post menopausal, 1 year OCP use, no HRT, no fertility treatment.     Family history of breast cancer: Doesn't know -adopted      Past Medical History:   has a past medical history of Endometriosis.      Current Outpatient Medications:      Calcium Carb-Cholecalciferol (CALCIUM 1000 + D PO), Calcium 1200 mg a day and D3 1000 international unit(s) , Disp: , Rfl:      estradiol (ESTRACE) 0.1 MG/GM vaginal cream, I 1 GRAM VAGINALLY 3 TIMES Q WK, Disp: , Rfl:      Omega-3 Fatty Acids (FISH OIL)  "1200 MG capsule, , Disp: , Rfl:     Past Surgical History:  Past Surgical History:   Procedure Laterality Date     HYSTEROSCOPY      lazer ablation of endometriosis     TONSILLECTOMY & ADENOIDECTOMY             Allergies   Allergen Reactions     Seasonal Allergies          Social History:  Social History     Socioeconomic History     Marital status:      Spouse name: Not on file     Number of children: Not on file     Years of education: Not on file     Highest education level: Not on file   Occupational History     Not on file   Tobacco Use     Smoking status: Never     Smokeless tobacco: Never   Substance and Sexual Activity     Alcohol use: Yes     Comment: socially     Drug use: No     Sexual activity: Yes     Partners: Male     Comment: menopause   Other Topics Concern     Parent/sibling w/ CABG, MI or angioplasty before 65F 55M? 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      Social Determinants of Health     Financial Resource Strain: Not on file   Food Insecurity: Not on file   Transportation Needs: Not on file   Physical Activity: Not on file   Stress: Not on file   Social Connections: Not on file   Intimate Partner Violence: Not on file   Housing Stability: Not on file        ROS:  The 10 point review of systems is negative other than noted in the HPI and above.    PE:  Vitals: /80   Pulse 81   Ht 1.746 m (5' 8.75\")   Wt 61.2 kg (135 lb)   BMI 20.08 kg/m    General appearance: well-nourished, sitting comfortably, no apparent distress  Psych: normal affect, pleasant  HEENT:  Head normocephalic and atraumatic, pupils equal and round, conjunctivae clear, mucous membranes moist, external ears and nose normal  Neck: Supple without thyromegaly or masses  Lungs: Respirations unlabored  Lymphatic: " No cervical, or supraclavicular lymphadenopathy  Extremities: Without edema  Musculoskeletal:  Normal station and gait  Neurologic: nonfocal, grossly intact times four extremities, alert and oriented times three  Psychiatric: Mood and affect are appropriate  Skin: Without lesions or rashes    Breast:  A bilateral breast exam was performed in the supine position.. Bilateral breasts were palpated in a circumferential clockwise fashion including the supraclavicular and axillary areas.   Breasts are symmetric.  Skin is normal.  Nipples are everted bilaterally.  There are no palpable masses in either breast.  Tissue is heterogeneously dense with increased density centrally as well as in the upper outer breast.      Lymph:       No supraclavicular/infraclavicular adenopathy.   Axillary adenopathy: none    Assessment/Plan: Katerina Elizondo is a 62 year old who presents with abnormal breast imaging as above and a question of if  Imaging is concordant.  We reviewed her imaging at length together today and I believe that the biopsied lesion was the cyst and the 7 mm mass may not have been biopsied.  This is difficult to determine and I would recommend additional imaging with a breast MRI to further evaluate both breast and help us determine level of concern of the 7 mm lesion in the left at 4:00 1 cm from the nipple.  We did discuss the option of surgical excisional biopsy and MRI will help guide this pending the findings.  Without an MR we could proceed with surgical excisional biopsy and I would need a radiofrequency tag localization presuming that the clip is in the right location which we would be able to tell by ultrasound.  If there is no enhancement on the MRI it may be reasonable to do a 6-month follow-up versus we will again discussed the surgical excisional biopsy option.  Hannah is in agreement with our plan and all questions were answered.    30 minutes total time spent on the date of this encounter doing: chart  review, review of test results, patient visit, physical exam, education, counseling, developing plan of care, and documenting.    Genoveva Parks MD      Please route or send letter to:  Primary Care Provider (PCP) and Referring Provider

## 2022-11-15 NOTE — NURSING NOTE
Breast Patients    BREAST PATIENTS (ALL)    1-Do you have any of the following symptoms?   2-In which breast are you having the symptoms? left  3-Have you had a Mammogram? Other Location:  [facility]    -  Date:  10/11/22  4-Have you ever had a breast cyst drained? Yes    Date: About 7 years ago   5-Have you ever had a breast biopsy? Yes:  Left   -   Date:  10/18/22  6-Have you ever had a Breast Cancer? No   7-Is there a history of Breast Cancer in your family? No  8-Have you ever had Ovarian Cancer? No  9-Is there a history of Ovarian Cancer in your family? No  10-Summarize your caffeine intake (i.e. coffee, tea, chocolate, soda etc.): 1-2 cups of coffee per day, or tea     BREAST PATIENTS (FEMALE)    11-What age did your periods begin? 12.5 years   12-Date your last menstrual period began? ?  13-Number of full-term pregnancies: 1  14-Your age when your first child was born? 30  15-Did you nurse your children? Yes  16-Are you pregnant now? No  17-Have you begun menopause? Yes  Age Menopause began:  49  18-Have you had either ovary removed?No  19-Do you have breast implants? No   20-Do you use hormone replacement therapy?  No  21-Have you taken oral contraceptive pills?  Yes, For how many years?  1 year in early 20's   22-Have you had an intrauterine device (IUD) placed?  No  23-What is your current bra size?  34 C/D    Sonam Roldan MA

## 2022-11-15 NOTE — PATIENT INSTRUCTIONS
Your breast MRI is scheduled for 12/1/22 12:00pm at the [facility]

## 2022-12-01 ENCOUNTER — ANCILLARY PROCEDURE (OUTPATIENT)
Dept: MRI IMAGING | Facility: CLINIC | Age: 62
End: 2022-12-01
Attending: SURGERY
Payer: COMMERCIAL

## 2022-12-01 DIAGNOSIS — N64.4 BREAST PAIN: ICD-10-CM

## 2022-12-01 DIAGNOSIS — R92.30 INCONCLUSIVE MAMMOGRAM DUE TO DENSE BREASTS: ICD-10-CM

## 2022-12-01 DIAGNOSIS — R92.2 INCONCLUSIVE MAMMOGRAM DUE TO DENSE BREASTS: ICD-10-CM

## 2022-12-01 DIAGNOSIS — R92.8 ABNORMAL MAMMOGRAM OF LEFT BREAST: ICD-10-CM

## 2022-12-01 PROCEDURE — A9585 GADOBUTROL INJECTION: HCPCS | Performed by: SURGERY

## 2022-12-01 PROCEDURE — 77049 MRI BREAST C-+ W/CAD BI: CPT

## 2022-12-01 PROCEDURE — 255N000002 HC RX 255 OP 636: Performed by: SURGERY

## 2022-12-01 RX ORDER — GADOBUTROL 604.72 MG/ML
6 INJECTION INTRAVENOUS ONCE
Status: COMPLETED | OUTPATIENT
Start: 2022-12-01 | End: 2022-12-01

## 2022-12-01 RX ADMIN — GADOBUTROL 6 ML: 604.72 INJECTION INTRAVENOUS at 12:09

## 2022-12-02 ENCOUNTER — TELEPHONE (OUTPATIENT)
Dept: SURGERY | Facility: CLINIC | Age: 62
End: 2022-12-02

## 2022-12-02 DIAGNOSIS — N63.23 MASS OF LOWER OUTER QUADRANT OF LEFT BREAST: Primary | ICD-10-CM

## 2022-12-02 NOTE — CONFIDENTIAL NOTE
I called Hannah and discussed her MRI results. She is comfortable proceeding with follow up mammo and US in 6 months.     Genoveva Parks MD  [facility]  [phone]

## 2022-12-03 ENCOUNTER — HEALTH MAINTENANCE LETTER (OUTPATIENT)
Age: 62
End: 2022-12-03

## 2023-03-07 ENCOUNTER — MYC MEDICAL ADVICE (OUTPATIENT)
Dept: PEDIATRICS | Facility: CLINIC | Age: 63
End: 2023-03-07

## 2023-04-18 ENCOUNTER — HOSPITAL ENCOUNTER (OUTPATIENT)
Dept: MAMMOGRAPHY | Facility: CLINIC | Age: 63
Discharge: HOME | End: 2023-04-18
Attending: SURGERY
Payer: COMMERCIAL

## 2023-04-18 DIAGNOSIS — N63.23 MASS OF LOWER OUTER QUADRANT OF LEFT BREAST: ICD-10-CM

## 2023-04-18 PROCEDURE — 76642 ULTRASOUND BREAST LIMITED: CPT | Mod: LT

## 2023-04-18 PROCEDURE — 77061 BREAST TOMOSYNTHESIS UNI: CPT | Mod: LT

## 2023-06-01 ENCOUNTER — HEALTH MAINTENANCE LETTER (OUTPATIENT)
Age: 63
End: 2023-06-01

## 2023-06-15 ENCOUNTER — TRANSFERRED RECORDS (OUTPATIENT)
Dept: HEALTH INFORMATION MANAGEMENT | Facility: CLINIC | Age: 63
End: 2023-06-15
Payer: COMMERCIAL

## 2023-07-12 ENCOUNTER — TRANSFERRED RECORDS (OUTPATIENT)
Dept: HEALTH INFORMATION MANAGEMENT | Facility: CLINIC | Age: 63
End: 2023-07-12

## 2023-07-12 LAB
HPV ABSTRACT: NORMAL
PAP-ABSTRACT: NORMAL

## 2023-09-25 SDOH — HEALTH STABILITY: PHYSICAL HEALTH: ON AVERAGE, HOW MANY DAYS PER WEEK DO YOU ENGAGE IN MODERATE TO STRENUOUS EXERCISE (LIKE A BRISK WALK)?: 4 DAYS

## 2023-09-25 SDOH — HEALTH STABILITY: PHYSICAL HEALTH: ON AVERAGE, HOW MANY MINUTES DO YOU ENGAGE IN EXERCISE AT THIS LEVEL?: 30 MIN

## 2023-09-25 ASSESSMENT — LIFESTYLE VARIABLES
HOW MANY STANDARD DRINKS CONTAINING ALCOHOL DO YOU HAVE ON A TYPICAL DAY: 1 OR 2
AUDIT-C TOTAL SCORE: 2
HOW OFTEN DO YOU HAVE SIX OR MORE DRINKS ON ONE OCCASION: NEVER
SKIP TO QUESTIONS 9-10: 1
HOW OFTEN DO YOU HAVE A DRINK CONTAINING ALCOHOL: 2-4 TIMES A MONTH

## 2023-09-25 ASSESSMENT — ENCOUNTER SYMPTOMS
PALPITATIONS: 0
PARESTHESIAS: 0
ABDOMINAL PAIN: 0
JOINT SWELLING: 0
MYALGIAS: 0
FREQUENCY: 0
DIARRHEA: 0
FEVER: 0
SHORTNESS OF BREATH: 0
HEMATURIA: 0
DIZZINESS: 0
CHILLS: 0
COUGH: 0
NERVOUS/ANXIOUS: 0
SORE THROAT: 0
BREAST MASS: 0
NAUSEA: 0
HEMATOCHEZIA: 0
HEADACHES: 0
WEAKNESS: 0
DYSURIA: 0
HEARTBURN: 0
ARTHRALGIAS: 0
CONSTIPATION: 0
EYE PAIN: 0

## 2023-09-25 ASSESSMENT — SOCIAL DETERMINANTS OF HEALTH (SDOH)
DO YOU BELONG TO ANY CLUBS OR ORGANIZATIONS SUCH AS CHURCH GROUPS UNIONS, FRATERNAL OR ATHLETIC GROUPS, OR SCHOOL GROUPS?: YES
HOW OFTEN DO YOU ATTEND CHURCH OR RELIGIOUS SERVICES?: MORE THAN 4 TIMES PER YEAR
HOW OFTEN DO YOU ATTENT MEETINGS OF THE CLUB OR ORGANIZATION YOU BELONG TO?: MORE THAN 4 TIMES PER YEAR
HOW OFTEN DO YOU GET TOGETHER WITH FRIENDS OR RELATIVES?: ONCE A WEEK
IN A TYPICAL WEEK, HOW MANY TIMES DO YOU TALK ON THE PHONE WITH FAMILY, FRIENDS, OR NEIGHBORS?: NEVER

## 2023-09-25 ASSESSMENT — ACTIVITIES OF DAILY LIVING (ADL): CURRENT_FUNCTION: NO ASSISTANCE NEEDED

## 2023-09-28 ENCOUNTER — OFFICE VISIT (OUTPATIENT)
Dept: PEDIATRICS | Facility: CLINIC | Age: 63
End: 2023-09-28
Payer: COMMERCIAL

## 2023-09-28 VITALS
BODY MASS INDEX: 19.31 KG/M2 | DIASTOLIC BLOOD PRESSURE: 72 MMHG | HEART RATE: 78 BPM | SYSTOLIC BLOOD PRESSURE: 135 MMHG | HEIGHT: 69 IN | RESPIRATION RATE: 20 BRPM | OXYGEN SATURATION: 99 % | WEIGHT: 130.4 LBS | TEMPERATURE: 97.4 F

## 2023-09-28 DIAGNOSIS — Z13.1 SCREENING FOR DIABETES MELLITUS: ICD-10-CM

## 2023-09-28 DIAGNOSIS — Z12.31 ENCOUNTER FOR SCREENING MAMMOGRAM FOR BREAST CANCER: ICD-10-CM

## 2023-09-28 DIAGNOSIS — M85.89 OSTEOPENIA OF MULTIPLE SITES: ICD-10-CM

## 2023-09-28 DIAGNOSIS — E78.5 HYPERLIPIDEMIA LDL GOAL <130: ICD-10-CM

## 2023-09-28 DIAGNOSIS — Z00.00 ENCOUNTER FOR MEDICARE ANNUAL WELLNESS EXAM: Primary | ICD-10-CM

## 2023-09-28 DIAGNOSIS — Z13.220 SCREENING CHOLESTEROL LEVEL: ICD-10-CM

## 2023-09-28 PROBLEM — K64.9 HEMORRHOIDS: Status: ACTIVE | Noted: 2023-06-15

## 2023-09-28 PROBLEM — K63.5 POLYP OF COLON: Status: ACTIVE | Noted: 2023-06-15

## 2023-09-28 PROCEDURE — 90662 IIV NO PRSV INCREASED AG IM: CPT | Performed by: INTERNAL MEDICINE

## 2023-09-28 PROCEDURE — 90677 PCV20 VACCINE IM: CPT | Performed by: INTERNAL MEDICINE

## 2023-09-28 PROCEDURE — G0009 ADMIN PNEUMOCOCCAL VACCINE: HCPCS | Performed by: INTERNAL MEDICINE

## 2023-09-28 PROCEDURE — G0402 INITIAL PREVENTIVE EXAM: HCPCS | Performed by: INTERNAL MEDICINE

## 2023-09-28 PROCEDURE — G0008 ADMIN INFLUENZA VIRUS VAC: HCPCS | Performed by: INTERNAL MEDICINE

## 2023-09-28 RX ORDER — FEXOFENADINE HCL 180 MG/1
1 TABLET ORAL DAILY
COMMUNITY
Start: 2022-04-01

## 2023-09-28 ASSESSMENT — ENCOUNTER SYMPTOMS
DYSURIA: 0
PARESTHESIAS: 0
SORE THROAT: 0
DIARRHEA: 0
DIZZINESS: 0
BREAST MASS: 0
JOINT SWELLING: 0
NAUSEA: 0
CONSTIPATION: 0
CHILLS: 0
FREQUENCY: 0
PALPITATIONS: 0
HEMATOCHEZIA: 0
HEMATURIA: 0
HEARTBURN: 0
COUGH: 0
ARTHRALGIAS: 0
FEVER: 0
EYE PAIN: 0
MYALGIAS: 0
NERVOUS/ANXIOUS: 0
WEAKNESS: 0
HEADACHES: 0
ABDOMINAL PAIN: 0
SHORTNESS OF BREATH: 0

## 2023-09-28 ASSESSMENT — ACTIVITIES OF DAILY LIVING (ADL): CURRENT_FUNCTION: NO ASSISTANCE NEEDED

## 2023-09-28 NOTE — PATIENT INSTRUCTIONS
Great to see you!    Keep up all the activity.     Rechecking cholesterol labs - let's see what they look like.     Tetanus vaccine at the pharmacy.     Patient Education   Personalized Prevention Plan  You are due for the preventive services outlined below.  Your care team is available to assist you in scheduling these services.  If you have already completed any of these items, please share that information with your care team to update in your medical record.  Health Maintenance Due   Topic Date Due    HIV Screening  Never done    ANNUAL REVIEW OF HM ORDERS  06/03/2021    COVID-19 Vaccine (4 - Pfizer series) 02/07/2022    Diptheria Tetanus Pertussis (DTAP/TDAP/TD) Vaccine (2 - Td or Tdap) 10/23/2022    Annual Wellness Visit  03/15/2023    Pneumococcal Vaccine (1 - PCV) Never done    Flu Vaccine (1) 09/01/2023     Hearing Loss: Care Instructions  Overview     Hearing loss is a sudden or slow decrease in how well you hear. It can range from slight to profound. Permanent hearing loss can occur with aging. It also can happen when you are exposed long-term to loud noise. Examples include listening to loud music, riding motorcycles, or being around other loud machines.  Hearing loss can affect your work and home life. It can make you feel lonely or depressed. You may feel that you have lost your independence. But hearing aids and other devices can help you hear better and feel connected to others.  Follow-up care is a key part of your treatment and safety. Be sure to make and go to all appointments, and call your doctor if you are having problems. It's also a good idea to know your test results and keep a list of the medicines you take.  How can you care for yourself at home?  Avoid loud noises whenever possible. This helps keep your hearing from getting worse.  Always wear hearing protection around loud noises.  Wear a hearing aid as directed.  A professional can help you pick a hearing aid that will work best for  "you.  You can also get hearing aids over the counter for mild to moderate hearing loss.  Have hearing tests as your doctor suggests. They can show whether your hearing has changed. Your hearing aid may need to be adjusted.  Use other devices as needed. These may include:  Telephone amplifiers and hearing aids that can connect to a television, stereo, radio, or microphone.  Devices that use lights or vibrations. These alert you to the doorbell, a ringing telephone, or a baby monitor.  Television closed-captioning. This shows the words at the bottom of the screen. Most new TVs can do this.  TTY (text telephone). This lets you type messages back and forth on the telephone instead of talking or listening. These devices are also called TDD. When messages are typed on the keyboard, they are sent over the phone line to a receiving TTY. The message is shown on a monitor.  Use text messaging, social media, and email if it is hard for you to communicate by telephone.  Try to learn a listening technique called speechreading. It is not lipreading. You pay attention to people's gestures, expressions, posture, and tone of voice. These clues can help you understand what a person is saying. Face the person you are talking to, and have them face you. Make sure the lighting is good. You need to see the other person's face clearly.  Think about counseling if you need help to adjust to your hearing loss.  When should you call for help?  Watch closely for changes in your health, and be sure to contact your doctor if:    You think your hearing is getting worse.     You have new symptoms, such as dizziness or nausea.   Where can you learn more?  Go to https://www.healthNanoference.net/patiented  Enter R798 in the search box to learn more about \"Hearing Loss: Care Instructions.\"  Current as of: March 1, 2023               Content Version: 13.7    1794-7794 [employer].   Care instructions adapted under license by your healthcare " professional. If you have questions about a medical condition or this instruction, always ask your healthcare professional. [employer] disclaims any warranty or liability for your use of this information.

## 2023-09-28 NOTE — PROGRESS NOTES
"SUBJECTIVE:   Hannah is a 63 year old who presents for Preventive Visit.      9/28/2023     8:13 AM   Additional Questions   Roomed by Corry Condon   Accompanied by N/A     Are you in the first 12 months of your Medicare coverage?  Yes,  Visual Acuity:  Right Eye: 20/25   Left Eye: 10/16  Both Eyes: 10/10    Healthy Habits:     In general, how would you rate your overall health?  Excellent    Frequency of exercise:  4-5 days/week    Duration of exercise:  30-45 minutes    Do you usually eat at least 4 servings of fruit and vegetables a day, include whole grains    & fiber and avoid regularly eating high fat or \"junk\" foods?  Yes    Taking medications regularly:  Yes    Medication side effects:  Not applicable    Ability to successfully perform activities of daily living:  No assistance needed    Home Safety:  Lack of grab bars in the bathroom    Hearing Impairment:  Difficulty following a conversation in a noisy restaurant or crowded room, find that men's voices are easier to understand than woman's and difficulty understanding soft or whispered speech    In the past 6 months, have you been bothered by leaking of urine?  No    In general, how would you rate your overall mental or emotional health?  Excellent    Additional concerns today:  Yes    Had pap and pelvic and breast exam w/ ob.    Today's PHQ-2 Score:       9/28/2023     8:04 AM   PHQ-2 ( 1999 Pfizer)   Q1: Little interest or pleasure in doing things 0   Q2: Feeling down, depressed or hopeless 0   PHQ-2 Score 0   Q1: Little interest or pleasure in doing things Not at all   Q2: Feeling down, depressed or hopeless Not at all   PHQ-2 Score 0     # Osteopenia  Vitamin D Deficiency Screening Results:  Lab Results   Component Value Date    VITDT 55 10/12/2021    VITDT 55 04/29/2014    VITDT  10/23/2012     Specimen sent to [employer], see misc result.   Dexa 1021 - Comparisons from different scanners that have not been cross calibrated, are not necessarily " valid. Such a comparison has been performed here; one should interpret with caution.   There has been probably no significant change in bone density of the lumbar spine. There has been a trend toward significant decrease in bone density of the hip(s).   Next dexa 10/2026    Have you ever done Advance Care Planning? (For example, a Health Directive, POLST, or a discussion with a medical provider or your loved ones about your wishes): No, advance care planning information given to patient to review.  Patient declined advance care planning discussion at this time.      Fall risk  Fallen 2 or more times in the past year?: No  Any fall with injury in the past year?: No    Cognitive Screening   1) Repeat 3 items (Leader, Season, Table)    2) Clock draw: NORMAL  3) 3 item recall: Recalls 3 objects  Results: 3 items recalled: COGNITIVE IMPAIRMENT LESS LIKELY    Mini-CogTM Copyright [employer] [place]. Licensed by the author for use in [facility]; reprinted with permission (joanne@[facility]). All rights reserved.      Do you have sleep apnea, excessive snoring or daytime drowsiness? : no    Reviewed and updated as needed this visit by clinical staff   Tobacco  Allergies  Meds              Reviewed and updated as needed this visit by Provider                 Social History     Tobacco Use    Smoking status: Never    Smokeless tobacco: Never   Substance Use Topics    Alcohol use: Yes     Comment: socially             9/25/2023     4:16 PM   Alcohol Use   Prescreen: >3 drinks/day or >7 drinks/week? No     Do you have a current opioid prescription? No  Do you use any other controlled substances or medications that are not prescribed by a provider? None      Current providers sharing in care for this patient include:   Patient Care Team:  Michael Umanzor MD as PCP - General (Internal Medicine - Pediatrics)  Michael Umanzor MD as Assigned PCP  Genoveva Parks MD as Assigned Surgical Provider    The  following health maintenance items are reviewed in Epic and correct as of today:  Health Maintenance   Topic Date Due    HIV SCREENING  Never done    ANNUAL REVIEW OF HM ORDERS  06/03/2021    COVID-19 Vaccine (4 - Pfizer series) 02/07/2022    DTAP/TDAP/TD IMMUNIZATION (2 - Td or Tdap) 10/23/2022    MEDICARE ANNUAL WELLNESS VISIT  03/15/2023    Pneumococcal Vaccine: 65+ Years (1 - PCV) Never done    INFLUENZA VACCINE (1) 09/01/2023    FALL RISK ASSESSMENT  09/28/2024    MAMMO SCREENING  04/18/2025    COLORECTAL CANCER SCREENING  06/15/2026    ADVANCE CARE PLANNING  09/21/2026    LIPID  10/12/2026    DEXA  10/25/2026    HEPATITIS C SCREENING  Completed    PHQ-2 (once per calendar year)  Completed    ZOSTER IMMUNIZATION  Completed    IPV IMMUNIZATION  Aged Out    HPV IMMUNIZATION  Aged Out    MENINGITIS IMMUNIZATION  Aged Out    PAP  Discontinued       FHS-7:       9/9/2021     3:22 PM 4/18/2023     9:41 AM   Breast CA Risk Assessment (FHS-7)   Did any of your first-degree relatives have breast or ovarian cancer? Unknown Unknown   Did any of your relatives have bilateral breast cancer? Unknown Unknown   Did any man in your family have breast cancer? Unknown Unknown   Did any woman in your family have breast and ovarian cancer? Unknown Unknown   Did any woman in your family have breast cancer before age 50 y? Unknown Unknown   Do you have 2 or more relatives with breast and/or ovarian cancer? Unknown Unknown   Do you have 2 or more relatives with breast and/or bowel cancer? Unknown Unknown     Mammogram Screening: Recommended mammography every 1-2 years with patient discussion and risk factor consideration  Pertinent mammograms are reviewed under the imaging tab.    Review of Systems   Constitutional:  Negative for chills and fever.   HENT:  Negative for congestion, ear pain, hearing loss and sore throat.    Eyes:  Negative for pain and visual disturbance.   Respiratory:  Negative for cough and shortness of breath.   "  Cardiovascular:  Negative for chest pain, palpitations and peripheral edema.   Gastrointestinal:  Negative for abdominal pain, constipation, diarrhea, heartburn, hematochezia and nausea.   Breasts:  Negative for tenderness, breast mass and discharge.   Genitourinary:  Negative for dysuria, frequency, genital sores, hematuria, pelvic pain, urgency, vaginal bleeding and vaginal discharge.   Musculoskeletal:  Negative for arthralgias, joint swelling and myalgias.   Skin:  Negative for rash.   Neurological:  Negative for dizziness, weakness, headaches and paresthesias.   Psychiatric/Behavioral:  Negative for mood changes. The patient is not nervous/anxious.        OBJECTIVE:   /72 (BP Location: Right arm, Patient Position: Sitting, Cuff Size: Adult Regular)   Pulse 78   Temp 97.4  F (36.3  C) (Tympanic)   Resp 20   Ht 1.75 m (5' 8.9\")   Wt 59.1 kg (130 lb 6.4 oz)   SpO2 99%   BMI 19.31 kg/m   Estimated body mass index is 19.31 kg/m  as calculated from the following:    Height as of this encounter: 1.75 m (5' 8.9\").    Weight as of this encounter: 59.1 kg (130 lb 6.4 oz).  Physical Exam  GENERAL: healthy, alert and no distress  EYES: Eyes grossly normal to inspection, PERRL and conjunctivae and sclerae normal  HENT: ear canals and TM's normal, nose and mouth without ulcers or lesions  NECK: no adenopathy, no asymmetry, masses, or scars and thyroid normal to palpation  RESP: lungs clear to auscultation - no rales, rhonchi or wheezes  CV: regular rate and rhythm, normal S1 S2, no S3 or S4, no murmur, click or rub, no peripheral edema and peripheral pulses strong  ABDOMEN: soft, nontender, no hepatosplenomegaly, no masses and bowel sounds normal  MS: no gross musculoskeletal defects noted, no edema  SKIN: no suspicious lesions or rashes  NEURO: Normal strength and tone, mentation intact and speech normal  PSYCH: mentation appears normal, affect normal/bright      ASSESSMENT / PLAN:       ICD-10-CM    1. " Encounter for Medicare annual wellness exam  Z00.00       2. Screening for HIV (human immunodeficiency virus)  Z11.4       3. Screening cholesterol level  Z13.220 Lipid panel reflex to direct LDL Fasting      4. Screening for diabetes mellitus  Z13.1 Comprehensive metabolic panel (BMP + Alb, Alk Phos, ALT, AST, Total. Bili, TP)      5. Encounter for screening mammogram for breast cancer  Z12.31 *MA Screening Digital Bilateral      6. Osteopenia of multiple sites  M85.89 Vitamin D Deficiency Screening Results:  Lab Results   Component Value Date    VITDT 55 10/12/2021    VITDT 55 04/29/2014    VITDT  10/23/2012     Specimen sent to [email or web address], see misc result.     Due dexa 2026      7. Hyperlipidemia LDL goal <130  E78.5 Adopted.   Discussed borderline LDL - birth mother did live into 80s.        Reading critically.    Great to see you!    Keep up all the activity.     Rechecking cholesterol labs - let's see what they look like.     Tetanus vaccine at the pharmacy.       COUNSELING:  Reviewed preventive health counseling, as reflected in patient instructions        She reports that she has never smoked. She has never used smokeless tobacco.      Appropriate preventive services were discussed with this patient, including applicable screening as appropriate for cardiovascular disease, diabetes, osteopenia/osteoporosis, and glaucoma.  As appropriate for age/gender, discussed screening for colorectal cancer, prostate cancer, breast cancer, and cervical cancer. Checklist reviewing preventive services available has been given to the patient.    Reviewed patients plan of care and provided an AVS. The Basic Care Plan (routine screening as documented in Health Maintenance) for Katerina meets the Care Plan requirement. This Care Plan has been established and reviewed with the Patient.          Michael Umanzor MD  [facility]    Identified Health Risks:  I have reviewed Opioid Use Disorder and  Substance Use Disorder risk factors and made any needed referrals.

## 2023-10-13 ENCOUNTER — LAB (OUTPATIENT)
Dept: LAB | Facility: CLINIC | Age: 63
End: 2023-10-13
Payer: COMMERCIAL

## 2023-10-13 DIAGNOSIS — Z13.220 SCREENING CHOLESTEROL LEVEL: ICD-10-CM

## 2023-10-13 DIAGNOSIS — Z13.1 SCREENING FOR DIABETES MELLITUS: ICD-10-CM

## 2023-10-13 LAB
ALBUMIN SERPL BCG-MCNC: 4.2 G/DL (ref 3.5–5.2)
ALP SERPL-CCNC: 48 U/L (ref 35–104)
ALT SERPL W P-5'-P-CCNC: 16 U/L (ref 0–50)
ANION GAP SERPL CALCULATED.3IONS-SCNC: 8 MMOL/L (ref 7–15)
AST SERPL W P-5'-P-CCNC: 24 U/L (ref 0–45)
BILIRUB SERPL-MCNC: 0.4 MG/DL
BUN SERPL-MCNC: 19.6 MG/DL (ref 8–23)
CALCIUM SERPL-MCNC: 9.6 MG/DL (ref 8.8–10.2)
CHLORIDE SERPL-SCNC: 104 MMOL/L (ref 98–107)
CHOLEST SERPL-MCNC: 235 MG/DL
CREAT SERPL-MCNC: 0.71 MG/DL (ref 0.51–0.95)
DEPRECATED HCO3 PLAS-SCNC: 27 MMOL/L (ref 22–29)
EGFRCR SERPLBLD CKD-EPI 2021: >90 ML/MIN/1.73M2
GLUCOSE SERPL-MCNC: 82 MG/DL (ref 70–99)
HDLC SERPL-MCNC: 46 MG/DL
LDLC SERPL CALC-MCNC: 175 MG/DL
NONHDLC SERPL-MCNC: 189 MG/DL
POTASSIUM SERPL-SCNC: 4.1 MMOL/L (ref 3.4–5.3)
PROT SERPL-MCNC: 6.8 G/DL (ref 6.4–8.3)
SODIUM SERPL-SCNC: 139 MMOL/L (ref 135–145)
TRIGL SERPL-MCNC: 70 MG/DL

## 2023-10-13 PROCEDURE — 36415 COLL VENOUS BLD VENIPUNCTURE: CPT

## 2023-10-13 PROCEDURE — 80061 LIPID PANEL: CPT

## 2023-10-13 PROCEDURE — 80053 COMPREHEN METABOLIC PANEL: CPT

## 2023-11-07 ENCOUNTER — ANCILLARY PROCEDURE (OUTPATIENT)
Dept: MAMMOGRAPHY | Facility: CLINIC | Age: 63
End: 2023-11-07
Attending: INTERNAL MEDICINE
Payer: COMMERCIAL

## 2023-11-07 DIAGNOSIS — Z12.31 VISIT FOR SCREENING MAMMOGRAM: ICD-10-CM

## 2023-11-07 PROCEDURE — 77063 BREAST TOMOSYNTHESIS BI: CPT | Mod: TC | Performed by: RADIOLOGY

## 2023-11-07 PROCEDURE — 77067 SCR MAMMO BI INCL CAD: CPT | Mod: TC | Performed by: RADIOLOGY

## 2024-03-04 ENCOUNTER — E-VISIT (OUTPATIENT)
Dept: PEDIATRICS | Facility: CLINIC | Age: 64
End: 2024-03-04
Payer: COMMERCIAL

## 2024-03-04 DIAGNOSIS — J01.90 ACUTE SINUSITIS WITH SYMPTOMS > 10 DAYS: Primary | ICD-10-CM

## 2024-03-04 PROCEDURE — 99421 OL DIG E/M SVC 5-10 MIN: CPT | Performed by: INTERNAL MEDICINE

## 2024-11-12 ENCOUNTER — ANCILLARY PROCEDURE (OUTPATIENT)
Dept: MAMMOGRAPHY | Facility: CLINIC | Age: 64
End: 2024-11-12
Attending: INTERNAL MEDICINE
Payer: COMMERCIAL

## 2024-11-12 DIAGNOSIS — Z12.31 VISIT FOR SCREENING MAMMOGRAM: ICD-10-CM

## 2024-11-12 PROCEDURE — 77067 SCR MAMMO BI INCL CAD: CPT | Mod: TC | Performed by: RADIOLOGY

## 2024-11-12 PROCEDURE — 77063 BREAST TOMOSYNTHESIS BI: CPT | Mod: TC | Performed by: RADIOLOGY

## 2025-01-16 ENCOUNTER — LAB (OUTPATIENT)
Dept: LAB | Facility: CLINIC | Age: 65
End: 2025-01-16
Payer: COMMERCIAL

## 2025-01-16 DIAGNOSIS — E78.5 HYPERLIPIDEMIA LDL GOAL <130: ICD-10-CM

## 2025-01-16 DIAGNOSIS — Z13.220 SCREENING CHOLESTEROL LEVEL: ICD-10-CM

## 2025-01-16 DIAGNOSIS — Z13.1 SCREENING FOR DIABETES MELLITUS: ICD-10-CM

## 2025-01-16 DIAGNOSIS — R63.4 WEIGHT LOSS: ICD-10-CM

## 2025-01-16 DIAGNOSIS — E55.9 VITAMIN D DEFICIENCY: ICD-10-CM

## 2025-01-16 DIAGNOSIS — M85.89 OSTEOPENIA OF MULTIPLE SITES: ICD-10-CM

## 2025-01-16 LAB
ALBUMIN SERPL BCG-MCNC: 4.3 G/DL (ref 3.5–5.2)
ALP SERPL-CCNC: 47 U/L (ref 40–150)
ALT SERPL W P-5'-P-CCNC: 15 U/L (ref 0–50)
ANION GAP SERPL CALCULATED.3IONS-SCNC: 9 MMOL/L (ref 7–15)
AST SERPL W P-5'-P-CCNC: 23 U/L (ref 0–45)
BILIRUB SERPL-MCNC: 0.3 MG/DL
BUN SERPL-MCNC: 20.6 MG/DL (ref 8–23)
CALCIUM SERPL-MCNC: 9.3 MG/DL (ref 8.8–10.4)
CHLORIDE SERPL-SCNC: 102 MMOL/L (ref 98–107)
CHOLEST SERPL-MCNC: 233 MG/DL
CREAT SERPL-MCNC: 0.73 MG/DL (ref 0.51–0.95)
EGFRCR SERPLBLD CKD-EPI 2021: 90 ML/MIN/1.73M2
ERYTHROCYTE [DISTWIDTH] IN BLOOD BY AUTOMATED COUNT: 12.1 % (ref 10–15)
FASTING STATUS PATIENT QL REPORTED: YES
FASTING STATUS PATIENT QL REPORTED: YES
GLUCOSE SERPL-MCNC: 79 MG/DL (ref 70–99)
HCO3 SERPL-SCNC: 25 MMOL/L (ref 22–29)
HCT VFR BLD AUTO: 39 % (ref 35–47)
HDLC SERPL-MCNC: 46 MG/DL
HGB BLD-MCNC: 13.1 G/DL (ref 11.7–15.7)
LDLC SERPL CALC-MCNC: 165 MG/DL
MCH RBC QN AUTO: 29.8 PG (ref 26.5–33)
MCHC RBC AUTO-ENTMCNC: 33.6 G/DL (ref 31.5–36.5)
MCV RBC AUTO: 89 FL (ref 78–100)
NONHDLC SERPL-MCNC: 187 MG/DL
PLATELET # BLD AUTO: 252 10E3/UL (ref 150–450)
POTASSIUM SERPL-SCNC: 3.7 MMOL/L (ref 3.4–5.3)
PROT SERPL-MCNC: 6.8 G/DL (ref 6.4–8.3)
RBC # BLD AUTO: 4.4 10E6/UL (ref 3.8–5.2)
SODIUM SERPL-SCNC: 136 MMOL/L (ref 135–145)
TRIGL SERPL-MCNC: 110 MG/DL
VIT D+METAB SERPL-MCNC: 56 NG/ML (ref 20–50)
WBC # BLD AUTO: 6.1 10E3/UL (ref 4–11)

## 2025-01-21 ENCOUNTER — MYC MEDICAL ADVICE (OUTPATIENT)
Dept: PEDIATRICS | Facility: CLINIC | Age: 65
End: 2025-01-21

## 2025-01-21 ENCOUNTER — OFFICE VISIT (OUTPATIENT)
Dept: INTERNAL MEDICINE | Facility: CLINIC | Age: 65
End: 2025-01-21
Payer: COMMERCIAL

## 2025-01-21 VITALS
HEART RATE: 80 BPM | WEIGHT: 123.3 LBS | SYSTOLIC BLOOD PRESSURE: 118 MMHG | RESPIRATION RATE: 16 BRPM | HEIGHT: 69 IN | OXYGEN SATURATION: 100 % | DIASTOLIC BLOOD PRESSURE: 80 MMHG | BODY MASS INDEX: 18.26 KG/M2

## 2025-01-21 DIAGNOSIS — M72.0 DUPUYTREN'S CONTRACTURE OF RIGHT HAND: ICD-10-CM

## 2025-01-21 DIAGNOSIS — E78.5 HYPERLIPIDEMIA LDL GOAL <130: Primary | ICD-10-CM

## 2025-01-21 DIAGNOSIS — Z01.818 ENCOUNTER FOR PREOPERATIVE ASSESSMENT: Primary | ICD-10-CM

## 2025-01-21 PROCEDURE — 99214 OFFICE O/P EST MOD 30 MIN: CPT | Performed by: INTERNAL MEDICINE

## 2025-01-21 NOTE — PROGRESS NOTES
Preoperative Evaluation  [facility]  [address] [address]  Phone: [phone]  Primary Provider: Michael Umanzor MD  Pre-op Performing Provider: Rai Zamarripa MD  Jan 21, 2025 1/20/2025   Surgical Information   What procedure is being done? Right open Small, Ring and Middle finger Partial Palmar Fasciectomy.   Facility or Hospital where procedure/surgery will be performed: [facility], [address] [place] [address].   Who is doing the procedure / surgery? Dr. Fidel Ross   Date of surgery / procedure: Tuesday, February 4, 2025.   Time of surgery / procedure: To be determined.   Where do you plan to recover after surgery? at home with family     Fax number for surgical facility: [phone]    Assessment & Plan   The proposed surgical procedure is considered INTERMEDIATE risk.    Encounter for preoperative assessment  Dupuytren's contracture of right hand  Approval given to proceed with proposed procedure without further diagnostic evaluation. Most recent labs reviewed. Patient able to perform at least 4 METS without getting anginal symptoms. Medications were reviewed in detail today. On the morning of surgery, take all normal medications with a small sip of water. Resume all medications post-operatively at the same doses unless otherwise directed by surgical care team.    Recommendation  Approval given to proceed with proposed procedure, without further diagnostic evaluation.    Signed Electronically by:  Rai Zamarripa MD, MPH  [facility]  Internal Medicine    Subjective   Hannah is a 64 year old presenting for the following: Pre-Op Exam  This is the first time I have met Hannah, who typically gets care at clinics closer to her residence.    HPI related to upcoming procedure: Hannah presents for a pre-op exam. She reports no personal history of cardiac disease. She is able to walk up a  flight of stairs without developing chest pain.        1/20/2025   Pre-Op Questionnaire   Have you ever had a heart attack or stroke? No   Have you ever had surgery on your heart or blood vessels, such as a stent placement, a coronary artery bypass, or surgery on an artery in your head, neck, heart, or legs? No   Do you have chest pain with activity? No   Do you have a history of heart failure? No   Do you currently have a cold, bronchitis or symptoms of other infection? No   Do you have a cough, shortness of breath, or wheezing? No   Do you or anyone in your family have previous history of blood clots? No   Do you or does anyone in your family have a serious bleeding problem such as prolonged bleeding following surgeries or cuts? No   Have you ever had problems with anemia or been told to take iron pills? No   Have you had any abnormal blood loss such as black, tarry or bloody stools, or abnormal vaginal bleeding? No   Have you ever had a blood transfusion? No   Are you willing to have a blood transfusion if it is medically needed before, during, or after your surgery? Yes   Have you or any of your relatives ever had problems with anesthesia? No   Do you have sleep apnea, excessive snoring or daytime drowsiness? No   Do you have any artifical heart valves or other implanted medical devices like a pacemaker, defibrillator, or continuous glucose monitor? No   Do you have artificial joints? No   Are you allergic to latex? No     Health Care Directive Patient does not have a Health Care Directive    Preoperative Review of    reviewed - no record of controlled substances prescribed.    Patient Active Problem List    Diagnosis Date Noted    Hemorrhoids 06/15/2023     Priority: Medium    Polyp of colon 06/15/2023     Priority: Medium    Dupuytren's contracture of right hand 09/20/2021     Priority: Medium    Hyperlipidemia LDL goal <130 05/05/2014     Priority: Medium    Osteopenia 12/09/2012     Priority: Low     "Menopause 10/23/2012     Priority: Low     50 y/o. No HRT        Past Medical History:   Diagnosis Date    Endometriosis      Past Surgical History:   Procedure Laterality Date    ABDOMEN SURGERY  In my my 30's.    Laparascopic laser ablation endometriosis and scope removal endometrial polyp..    BIOPSY  Left breast and ax node needle biopsy- benign. Colonoscopic removal 2 benign polyps June '23.    COLONOSCOPY  6/15/23    2 benign polyps removed right side colon; [facility]    GYN SURGERY  1997    laparoscopy laser endometriosis,endometrial polyp    HYSTEROSCOPY      lazer ablation of endometriosis    TONSILLECTOMY & ADENOIDECTOMY       Current Outpatient Medications   Medication Sig Dispense Refill    Calcium Carb-Cholecalciferol (CALCIUM 1000 + D PO) Calcium 1200 mg a day and D3 1000 international unit(s)       estradiol (ESTRACE) 0.1 MG/GM vaginal cream I 1 GRAM VAGINALLY 3 TIMES Q WK       Allergies   Allergen Reactions    Seasonal Allergies       Social History     Tobacco Use    Smoking status: Never     Passive exposure: Never    Smokeless tobacco: Never   Substance Use Topics    Alcohol use: Yes     Comment: occasional hard seltzer,beer,wine.     History   Drug Use No     Objective    /80   Pulse 80   Resp 16   Ht 1.753 m (5' 9\")   Wt 55.9 kg (123 lb 4.8 oz)   LMP  (LMP Unknown)   SpO2 100%   BMI 18.21 kg/m     Estimated body mass index is 18.21 kg/m  as calculated from the following:    Height as of this encounter: 1.753 m (5' 9\").    Weight as of this encounter: 55.9 kg (123 lb 4.8 oz).    Physical Exam  GENERAL: alert and in no distress.  EYES: conjunctivae/corneas clear. EOMs grossly intact  HENT: Facies symmetric.  RESP: CTAB, no w/r/r.  CV: RRR, no m/r/g.  MSK: Moves all four extremities freely.  SKIN: No significant ulcers, lesions, or rashes on the visualized portions of the skin  NEURO: CN II-XII grossly intact.    Recent Labs   Lab Test 01/16/25  0758   HGB 13.1       "   POTASSIUM 3.7   CR 0.73      Diagnostics  No labs were ordered during this visit.   No EKG required, no history of coronary heart disease, significant arrhythmia, peripheral arterial disease or other structural heart disease.    Revised Cardiac Risk Index (RCRI)  The patient has the following serious cardiovascular risks for perioperative complications:   - No serious cardiac risks = 0 points   RCRI Interpretation: 0 points: Class I (very low risk - 0.4% complication rate)    Signed Electronically by: Rai Zamarripa MD  A copy of this evaluation report is provided to the requesting physician.

## 2025-01-21 NOTE — TELEPHONE ENCOUNTER
"Please see patient MyChart message  -states taking calcium citrate supplement + D3  max plus twice daily  -inquiring how much to cut back as each serving has 1000 international units vit D and 650 international units Ca  -inquiring about PCP thoughts on next steps for cholesterol    Per chart review  Lab result note 1/16/25 with Shereen Whitmore  \". Your cholesterol is up since last check. I reviewed your chart and it looks like Dr. Umanzor has been monitoring this for awhile. Your 10 year risk of heart attack or stroke is 7.8% and your LDL or \"bad\" cholesterol is higher than previous. At this point, we should consider treating with medication or alternatively we could do something called a coronary artery scan to assess for plaque. Let me know what you prefer and I can help facilitate.     Your vitamin D levels are on the higher side. You could cut back on your supplement.\"    Dr. Umanzor and Shereen Whitmore please review and advise.    Meenu Bal RN    "

## 2025-01-21 NOTE — PROGRESS NOTES
Pre op physical electronically faxed to [facility] at [phone].  Rightfax confirmed.

## 2025-02-17 ENCOUNTER — TRANSFERRED RECORDS (OUTPATIENT)
Dept: HEALTH INFORMATION MANAGEMENT | Facility: CLINIC | Age: 65
End: 2025-02-17
Payer: COMMERCIAL

## 2025-03-12 ENCOUNTER — TRANSFERRED RECORDS (OUTPATIENT)
Dept: HEALTH INFORMATION MANAGEMENT | Facility: CLINIC | Age: 65
End: 2025-03-12
Payer: COMMERCIAL

## 2025-04-12 ENCOUNTER — HEALTH MAINTENANCE LETTER (OUTPATIENT)
Age: 65
End: 2025-04-12

## 2025-07-28 ENCOUNTER — TELEPHONE (OUTPATIENT)
Dept: PEDIATRICS | Facility: CLINIC | Age: 65
End: 2025-07-28
Payer: COMMERCIAL

## 2025-07-28 NOTE — TELEPHONE ENCOUNTER
Reason for Call:  Appointment Request    Patient requesting this type of appt:  other    Requested provider: Michael Umanzor    Reason patient unable to be scheduled: Needs to be scheduled by clinic    When does patient want to be seen/preferred time: anytime     Comments: Pt scheduled an appt on 9/12 to discuss health issue; would like to be seen sooner    Could we send this information to you in [employer] or would you prefer to receive a phone call?:   Patient would like to be contacted via [employer]    Call taken on 7/28/2025 at 1:04 PM by Mitali Noguera

## 2025-09-04 ENCOUNTER — VIRTUAL VISIT (OUTPATIENT)
Dept: PEDIATRICS | Facility: CLINIC | Age: 65
End: 2025-09-04
Payer: COMMERCIAL

## 2025-09-04 DIAGNOSIS — M79.672 BILATERAL FOOT PAIN: ICD-10-CM

## 2025-09-04 DIAGNOSIS — M79.671 BILATERAL FOOT PAIN: ICD-10-CM

## 2025-09-04 DIAGNOSIS — M72.0 DUPUYTREN'S CONTRACTURE OF RIGHT HAND: Primary | ICD-10-CM
